# Patient Record
Sex: FEMALE | Race: WHITE | Employment: UNEMPLOYED | URBAN - METROPOLITAN AREA
[De-identification: names, ages, dates, MRNs, and addresses within clinical notes are randomized per-mention and may not be internally consistent; named-entity substitution may affect disease eponyms.]

---

## 2017-02-02 ENCOUNTER — TELEPHONE (OUTPATIENT)
Dept: FAMILY MEDICINE CLINIC | Age: 27
End: 2017-02-02

## 2017-02-03 ENCOUNTER — OFFICE VISIT (OUTPATIENT)
Dept: FAMILY MEDICINE CLINIC | Age: 27
End: 2017-02-03

## 2017-02-03 VITALS
WEIGHT: 188 LBS | TEMPERATURE: 97.8 F | BODY MASS INDEX: 33.31 KG/M2 | HEIGHT: 63 IN | OXYGEN SATURATION: 98 % | HEART RATE: 87 BPM | DIASTOLIC BLOOD PRESSURE: 64 MMHG | SYSTOLIC BLOOD PRESSURE: 122 MMHG | RESPIRATION RATE: 12 BRPM

## 2017-02-03 DIAGNOSIS — E78.2 MIXED HYPERLIPIDEMIA: Primary | ICD-10-CM

## 2017-02-03 DIAGNOSIS — Z3A.01 LESS THAN 8 WEEKS GESTATION OF PREGNANCY: ICD-10-CM

## 2017-02-03 PROCEDURE — 99213 OFFICE O/P EST LOW 20 MIN: CPT | Performed by: FAMILY MEDICINE

## 2017-02-03 RX ORDER — CITALOPRAM 10 MG/1
10 TABLET ORAL DAILY
COMMUNITY
Start: 2017-01-19 | End: 2017-02-03

## 2017-02-03 RX ORDER — AMOXICILLIN 500 MG
CAPSULE ORAL
COMMUNITY
End: 2017-11-21 | Stop reason: ALTCHOICE

## 2017-02-03 RX ORDER — VITAMIN A, ASCORBIC ACID, CHOLECALCIFEROL, .ALPHA.-TOCOPHEROL ACETATE, DL-, THIAMINE MONONITRATE, RIBOFLAVIN, NIACINAMIDE, PYRIDOXINE HYDROCHLORIDE, FOLIC ACID, CYANOCOBALAMIN, CALCIUM CARBONATE, IRON, ZINC OXIDE, AND CUPRIC OXIDE 4000; 120; 400; 22; 1.84; 3; 20; 10; 1; 12; 200; 29; 25; 2 [IU]/1; MG/1; [IU]/1; [IU]/1; MG/1; MG/1; MG/1; MG/1; MG/1; UG/1; MG/1; MG/1; MG/1; MG/1
1 TABLET ORAL DAILY
Qty: 90 TABLET | Refills: 3 | Status: SHIPPED | OUTPATIENT
Start: 2017-02-03 | End: 2017-11-21 | Stop reason: ALTCHOICE

## 2017-02-03 ASSESSMENT — ENCOUNTER SYMPTOMS
ALLERGIC/IMMUNOLOGIC NEGATIVE: 1
EYES NEGATIVE: 1
GASTROINTESTINAL NEGATIVE: 1
ABDOMINAL PAIN: 0
RESPIRATORY NEGATIVE: 1

## 2017-05-10 ENCOUNTER — OFFICE VISIT (OUTPATIENT)
Dept: FAMILY MEDICINE CLINIC | Age: 27
End: 2017-05-10

## 2017-05-10 ENCOUNTER — TELEPHONE (OUTPATIENT)
Dept: FAMILY MEDICINE CLINIC | Age: 27
End: 2017-05-10

## 2017-05-10 VITALS
RESPIRATION RATE: 18 BRPM | DIASTOLIC BLOOD PRESSURE: 68 MMHG | HEIGHT: 64 IN | BODY MASS INDEX: 33.29 KG/M2 | HEART RATE: 74 BPM | TEMPERATURE: 97.5 F | OXYGEN SATURATION: 97 % | WEIGHT: 195 LBS | SYSTOLIC BLOOD PRESSURE: 124 MMHG

## 2017-05-10 DIAGNOSIS — J01.10 ACUTE NON-RECURRENT FRONTAL SINUSITIS: Primary | ICD-10-CM

## 2017-05-10 PROCEDURE — 99213 OFFICE O/P EST LOW 20 MIN: CPT | Performed by: FAMILY MEDICINE

## 2017-05-10 RX ORDER — AMOXICILLIN 875 MG/1
875 TABLET, COATED ORAL 2 TIMES DAILY
Qty: 20 TABLET | Refills: 0 | Status: SHIPPED | OUTPATIENT
Start: 2017-05-10 | End: 2017-05-20

## 2017-05-10 ASSESSMENT — ENCOUNTER SYMPTOMS
SINUS PRESSURE: 1
RESPIRATORY NEGATIVE: 1
COUGH: 0
CHEST TIGHTNESS: 0
RHINORRHEA: 1
EYES NEGATIVE: 1
GASTROINTESTINAL NEGATIVE: 1

## 2017-05-12 ENCOUNTER — TELEPHONE (OUTPATIENT)
Dept: FAMILY MEDICINE CLINIC | Age: 27
End: 2017-05-12

## 2017-05-12 DIAGNOSIS — H05.119: Primary | ICD-10-CM

## 2017-09-21 ENCOUNTER — OFFICE VISIT (OUTPATIENT)
Dept: FAMILY MEDICINE CLINIC | Age: 27
End: 2017-09-21

## 2017-09-21 VITALS
HEART RATE: 89 BPM | TEMPERATURE: 98.1 F | BODY MASS INDEX: 3.76 KG/M2 | RESPIRATION RATE: 14 BRPM | WEIGHT: 22 LBS | SYSTOLIC BLOOD PRESSURE: 120 MMHG | HEIGHT: 64 IN | OXYGEN SATURATION: 98 % | DIASTOLIC BLOOD PRESSURE: 70 MMHG

## 2017-09-21 DIAGNOSIS — S00.421A: ICD-10-CM

## 2017-09-21 DIAGNOSIS — H66.002 ACUTE SUPPURATIVE OTITIS MEDIA OF LEFT EAR WITHOUT SPONTANEOUS RUPTURE OF TYMPANIC MEMBRANE, RECURRENCE NOT SPECIFIED: Primary | ICD-10-CM

## 2017-09-21 PROCEDURE — 99213 OFFICE O/P EST LOW 20 MIN: CPT | Performed by: NURSE PRACTITIONER

## 2017-09-21 RX ORDER — CIPROFLOXACIN AND DEXAMETHASONE 3; 1 MG/ML; MG/ML
4 SUSPENSION/ DROPS AURICULAR (OTIC) 2 TIMES DAILY
Qty: 1 BOTTLE | Refills: 0 | Status: SHIPPED | OUTPATIENT
Start: 2017-09-21 | End: 2017-09-28

## 2017-09-21 RX ORDER — AMOXICILLIN 875 MG/1
TABLET, COATED ORAL
Refills: 0 | COMMUNITY
Start: 2017-09-13 | End: 2017-09-23

## 2017-09-21 RX ORDER — AMOXICILLIN AND CLAVULANATE POTASSIUM 200; 28.5 MG/1; MG/1
1 TABLET, CHEWABLE ORAL 2 TIMES DAILY
Qty: 20 TABLET | Refills: 0 | Status: SHIPPED | OUTPATIENT
Start: 2017-09-21 | End: 2017-10-01

## 2017-09-21 ASSESSMENT — PATIENT HEALTH QUESTIONNAIRE - PHQ9
SUM OF ALL RESPONSES TO PHQ QUESTIONS 1-9: 0
SUM OF ALL RESPONSES TO PHQ9 QUESTIONS 1 & 2: 0
1. LITTLE INTEREST OR PLEASURE IN DOING THINGS: 0
2. FEELING DOWN, DEPRESSED OR HOPELESS: 0

## 2017-09-23 ASSESSMENT — ENCOUNTER SYMPTOMS
DIARRHEA: 0
ABDOMINAL PAIN: 0
VOMITING: 0
RHINORRHEA: 0
COUGH: 0
SORE THROAT: 0

## 2017-11-18 DIAGNOSIS — E78.2 MIXED HYPERLIPIDEMIA: ICD-10-CM

## 2017-11-18 DIAGNOSIS — Z11.4 SCREENING FOR HIV WITHOUT PRESENCE OF RISK FACTORS: ICD-10-CM

## 2017-11-18 LAB
ALBUMIN SERPL-MCNC: 4 G/DL (ref 3.9–4.9)
ALP BLD-CCNC: 96 U/L (ref 40–130)
ALT SERPL-CCNC: 32 U/L (ref 0–33)
ANION GAP SERPL CALCULATED.3IONS-SCNC: 16 MEQ/L (ref 7–13)
AST SERPL-CCNC: 20 U/L (ref 0–35)
BILIRUB SERPL-MCNC: 0.4 MG/DL (ref 0–1.2)
BUN BLDV-MCNC: 17 MG/DL (ref 6–20)
CALCIUM SERPL-MCNC: 9.1 MG/DL (ref 8.6–10.2)
CHLORIDE BLD-SCNC: 102 MEQ/L (ref 98–107)
CHOLESTEROL, TOTAL: 277 MG/DL (ref 0–199)
CO2: 24 MEQ/L (ref 22–29)
CREAT SERPL-MCNC: 0.64 MG/DL (ref 0.5–0.9)
GFR AFRICAN AMERICAN: >60
GFR NON-AFRICAN AMERICAN: >60
GLOBULIN: 2.8 G/DL (ref 2.3–3.5)
GLUCOSE BLD-MCNC: 78 MG/DL (ref 74–109)
HDLC SERPL-MCNC: 43 MG/DL (ref 40–59)
LDL CHOLESTEROL CALCULATED: 184 MG/DL (ref 0–129)
POTASSIUM SERPL-SCNC: 4.8 MEQ/L (ref 3.5–5.1)
SODIUM BLD-SCNC: 142 MEQ/L (ref 132–144)
TOTAL PROTEIN: 6.8 G/DL (ref 6.4–8.1)
TRIGL SERPL-MCNC: 250 MG/DL (ref 0–200)

## 2017-11-21 ENCOUNTER — OFFICE VISIT (OUTPATIENT)
Dept: FAMILY MEDICINE CLINIC | Age: 27
End: 2017-11-21

## 2017-11-21 VITALS
OXYGEN SATURATION: 98 % | HEART RATE: 71 BPM | TEMPERATURE: 98.2 F | RESPIRATION RATE: 16 BRPM | SYSTOLIC BLOOD PRESSURE: 120 MMHG | DIASTOLIC BLOOD PRESSURE: 82 MMHG | WEIGHT: 198 LBS | HEIGHT: 64 IN | BODY MASS INDEX: 33.8 KG/M2

## 2017-11-21 DIAGNOSIS — G93.2 PSEUDOTUMOR CEREBRI: ICD-10-CM

## 2017-11-21 DIAGNOSIS — G44.219 EPISODIC TENSION-TYPE HEADACHE, NOT INTRACTABLE: ICD-10-CM

## 2017-11-21 DIAGNOSIS — B36.0 TINEA VERSICOLOR: ICD-10-CM

## 2017-11-21 DIAGNOSIS — E78.2 MIXED HYPERLIPIDEMIA: Primary | ICD-10-CM

## 2017-11-21 LAB — HIV-1 AND HIV-2 ANTIBODIES: NEGATIVE

## 2017-11-21 PROCEDURE — 99214 OFFICE O/P EST MOD 30 MIN: CPT | Performed by: FAMILY MEDICINE

## 2017-11-21 RX ORDER — PRAVASTATIN SODIUM 40 MG
40 TABLET ORAL EVERY EVENING
Qty: 30 TABLET | Refills: 11 | Status: SHIPPED | OUTPATIENT
Start: 2017-11-21 | End: 2018-05-29 | Stop reason: SDUPTHER

## 2017-11-21 RX ORDER — HYDROCHLOROTHIAZIDE 12.5 MG/1
12.5 TABLET ORAL DAILY
Qty: 30 TABLET | Refills: 11 | Status: SHIPPED | OUTPATIENT
Start: 2017-11-21

## 2017-11-21 RX ORDER — NORETHINDRONE ACETATE AND ETHINYL ESTRADIOL 1MG-20(24)
KIT ORAL
Refills: 3 | COMMUNITY
Start: 2017-11-15

## 2017-11-21 ASSESSMENT — ENCOUNTER SYMPTOMS
ABDOMINAL PAIN: 0
GASTROINTESTINAL NEGATIVE: 1
ALLERGIC/IMMUNOLOGIC NEGATIVE: 1
RESPIRATORY NEGATIVE: 1
SHORTNESS OF BREATH: 0
EYES NEGATIVE: 1

## 2017-11-21 NOTE — PROGRESS NOTES
Social History Main Topics    Smoking status: Never Smoker    Smokeless tobacco: Never Used    Alcohol use No    Drug use: No    Sexual activity: Not on file     Other Topics Concern    Not on file     Social History Narrative    No narrative on file     Family History   Problem Relation Age of Onset    High Blood Pressure Mother     Depression Mother     Anxiety Disorder Mother     High Blood Pressure Father     High Cholesterol Father     Anxiety Disorder Brother      ABM     Allergies   Allergen Reactions    Zoloft [Sertraline Hcl] Anxiety     Thought of harming self    Buspirone Anxiety    Paxil [Paroxetine Hcl] Other (See Comments)     flushing    Wellbutrin [Bupropion] Anxiety     No current outpatient prescriptions on file prior to visit. No current facility-administered medications on file prior to visit. Objective    Vitals:    11/21/17 1645   BP: 120/82   Pulse: 71   Resp: 16   Temp: 98.2 °F (36.8 °C)   TempSrc: Tympanic   SpO2: 98%   Weight: 198 lb (89.8 kg)   Height: 5' 4\" (1.626 m)     Physical Exam   Constitutional: Vital signs are normal. She appears well-developed and well-nourished. No distress. HENT:   Head: Normocephalic and atraumatic. Right Ear: Tympanic membrane, external ear and ear canal normal. Tympanic membrane is not erythematous and not retracted. No middle ear effusion. Left Ear: Tympanic membrane, external ear and ear canal normal. Tympanic membrane is not erythematous and not retracted. No middle ear effusion. Nose: Nose normal. No mucosal edema, rhinorrhea or septal deviation. Right sinus exhibits no maxillary sinus tenderness and no frontal sinus tenderness. Left sinus exhibits no maxillary sinus tenderness and no frontal sinus tenderness. Mouth/Throat: Uvula is midline, oropharynx is clear and moist and mucous membranes are normal.   Eyes: Conjunctivae, EOM and lids are normal. Pupils are equal, round, and reactive to light.    Neck: Trachea Panel     Standing Status:   Future     Standing Expiration Date:   11/21/2018     Order Specific Question:   Is Patient Fasting?/# of Hours     Answer:   8    TSH ULTRASENSITIVE, DIRECTED     Standing Status:   Future     Standing Expiration Date:   11/21/2018    OMT 1-2 BODY REGIONS     Orders Placed This Encounter   Medications    pravastatin (PRAVACHOL) 40 MG tablet     Sig: Take 1 tablet by mouth every evening     Dispense:  30 tablet     Refill:  11    hydrochlorothiazide (HYDRODIURIL) 12.5 MG tablet     Sig: Take 1 tablet by mouth daily     Dispense:  30 tablet     Refill:  11     Medications Discontinued During This Encounter   Medication Reason    Prenatal Vit-Iron Carbonyl-FA (PRENATAL PLUS IRON) 29-1 MG TABS Therapy completed    Omega-3 Fatty Acids (FISH OIL) 1200 MG CAPS Therapy completed   OMT Soft Tissue/suboccipital release done in cervical area with 50% reported resolution of symptomology. Patient tolerated well. Home exercises were demonstrated. Maintain selenium shampoo at least weekly  Counseling given: Yes      Return in about 6 months (around 5/21/2018).     Blair Busby, DO

## 2018-05-21 DIAGNOSIS — E78.2 MIXED HYPERLIPIDEMIA: ICD-10-CM

## 2018-05-21 DIAGNOSIS — G44.219 EPISODIC TENSION-TYPE HEADACHE, NOT INTRACTABLE: ICD-10-CM

## 2018-05-21 LAB
ALBUMIN SERPL-MCNC: 4.5 G/DL (ref 3.9–4.9)
ALP BLD-CCNC: 81 U/L (ref 40–130)
ALT SERPL-CCNC: 17 U/L (ref 0–33)
ANION GAP SERPL CALCULATED.3IONS-SCNC: 16 MEQ/L (ref 7–13)
AST SERPL-CCNC: 16 U/L (ref 0–35)
BASOPHILS ABSOLUTE: 0 K/UL (ref 0–0.2)
BASOPHILS RELATIVE PERCENT: 0.3 %
BILIRUB SERPL-MCNC: 0.5 MG/DL (ref 0–1.2)
BUN BLDV-MCNC: 15 MG/DL (ref 6–20)
CALCIUM SERPL-MCNC: 9.5 MG/DL (ref 8.6–10.2)
CHLORIDE BLD-SCNC: 101 MEQ/L (ref 98–107)
CHOLESTEROL, TOTAL: 237 MG/DL (ref 0–199)
CO2: 24 MEQ/L (ref 22–29)
CREAT SERPL-MCNC: 0.66 MG/DL (ref 0.5–0.9)
EOSINOPHILS ABSOLUTE: 0.1 K/UL (ref 0–0.7)
EOSINOPHILS RELATIVE PERCENT: 1 %
GFR AFRICAN AMERICAN: >60
GFR NON-AFRICAN AMERICAN: >60
GLOBULIN: 2.6 G/DL (ref 2.3–3.5)
GLUCOSE BLD-MCNC: 83 MG/DL (ref 74–109)
HCT VFR BLD CALC: 42.4 % (ref 37–47)
HDLC SERPL-MCNC: 47 MG/DL (ref 40–59)
HEMOGLOBIN: 14.7 G/DL (ref 12–16)
LDL CHOLESTEROL CALCULATED: 148 MG/DL (ref 0–129)
LYMPHOCYTES ABSOLUTE: 2.5 K/UL (ref 1–4.8)
LYMPHOCYTES RELATIVE PERCENT: 28.6 %
MCH RBC QN AUTO: 30.1 PG (ref 27–31.3)
MCHC RBC AUTO-ENTMCNC: 34.6 % (ref 33–37)
MCV RBC AUTO: 87 FL (ref 82–100)
MONOCYTES ABSOLUTE: 0.5 K/UL (ref 0.2–0.8)
MONOCYTES RELATIVE PERCENT: 5.4 %
NEUTROPHILS ABSOLUTE: 5.6 K/UL (ref 1.4–6.5)
NEUTROPHILS RELATIVE PERCENT: 64.7 %
PDW BLD-RTO: 13.9 % (ref 11.5–14.5)
PLATELET # BLD: 270 K/UL (ref 130–400)
POTASSIUM SERPL-SCNC: 4.4 MEQ/L (ref 3.5–5.1)
RBC # BLD: 4.87 M/UL (ref 4.2–5.4)
SODIUM BLD-SCNC: 141 MEQ/L (ref 132–144)
TOTAL PROTEIN: 7.1 G/DL (ref 6.4–8.1)
TRIGL SERPL-MCNC: 208 MG/DL (ref 0–200)
WBC # BLD: 8.7 K/UL (ref 4.8–10.8)

## 2018-05-22 ENCOUNTER — TELEPHONE (OUTPATIENT)
Dept: FAMILY MEDICINE CLINIC | Age: 28
End: 2018-05-22

## 2018-05-29 ENCOUNTER — OFFICE VISIT (OUTPATIENT)
Dept: FAMILY MEDICINE CLINIC | Age: 28
End: 2018-05-29
Payer: COMMERCIAL

## 2018-05-29 VITALS
DIASTOLIC BLOOD PRESSURE: 80 MMHG | HEIGHT: 63 IN | OXYGEN SATURATION: 99 % | HEART RATE: 70 BPM | SYSTOLIC BLOOD PRESSURE: 100 MMHG | BODY MASS INDEX: 35.44 KG/M2 | RESPIRATION RATE: 16 BRPM | WEIGHT: 200 LBS | TEMPERATURE: 98.6 F

## 2018-05-29 DIAGNOSIS — G93.2 PSEUDOTUMOR CEREBRI: ICD-10-CM

## 2018-05-29 DIAGNOSIS — E78.2 MIXED HYPERLIPIDEMIA: Primary | ICD-10-CM

## 2018-05-29 PROCEDURE — 99214 OFFICE O/P EST MOD 30 MIN: CPT | Performed by: FAMILY MEDICINE

## 2018-05-29 RX ORDER — PRAVASTATIN SODIUM 80 MG/1
80 TABLET ORAL EVERY EVENING
Qty: 30 TABLET | Refills: 5 | Status: SHIPPED | OUTPATIENT
Start: 2018-05-29

## 2018-05-29 ASSESSMENT — ENCOUNTER SYMPTOMS
RESPIRATORY NEGATIVE: 1
GASTROINTESTINAL NEGATIVE: 1
EYES NEGATIVE: 1
ALLERGIC/IMMUNOLOGIC NEGATIVE: 1

## 2018-10-24 ENCOUNTER — OFFICE VISIT (OUTPATIENT)
Dept: FAMILY MEDICINE CLINIC | Age: 28
End: 2018-10-24
Payer: COMMERCIAL

## 2018-10-24 VITALS
HEART RATE: 85 BPM | SYSTOLIC BLOOD PRESSURE: 118 MMHG | BODY MASS INDEX: 35.51 KG/M2 | WEIGHT: 208 LBS | DIASTOLIC BLOOD PRESSURE: 82 MMHG | OXYGEN SATURATION: 98 % | TEMPERATURE: 97.8 F | RESPIRATION RATE: 15 BRPM | HEIGHT: 64 IN

## 2018-10-24 DIAGNOSIS — H10.021 PINK EYE DISEASE OF RIGHT EYE: Primary | ICD-10-CM

## 2018-10-24 PROCEDURE — 99173 VISUAL ACUITY SCREEN: CPT | Performed by: INTERNAL MEDICINE

## 2018-10-24 PROCEDURE — 99213 OFFICE O/P EST LOW 20 MIN: CPT | Performed by: INTERNAL MEDICINE

## 2018-10-24 RX ORDER — ERYTHROMYCIN 5 MG/G
OINTMENT OPHTHALMIC 3 TIMES DAILY
Qty: 1 TUBE | Refills: 0 | Status: SHIPPED | OUTPATIENT
Start: 2018-10-24 | End: 2018-11-03

## 2018-10-24 ASSESSMENT — PATIENT HEALTH QUESTIONNAIRE - PHQ9
1. LITTLE INTEREST OR PLEASURE IN DOING THINGS: 0
SUM OF ALL RESPONSES TO PHQ QUESTIONS 1-9: 0
SUM OF ALL RESPONSES TO PHQ9 QUESTIONS 1 & 2: 0
2. FEELING DOWN, DEPRESSED OR HOPELESS: 0
SUM OF ALL RESPONSES TO PHQ QUESTIONS 1-9: 0

## 2018-10-24 ASSESSMENT — ENCOUNTER SYMPTOMS
EYE PAIN: 0
BACK PAIN: 0
ABDOMINAL PAIN: 0
SHORTNESS OF BREATH: 0

## 2019-03-27 ENCOUNTER — TELEPHONE (OUTPATIENT)
Dept: FAMILY MEDICINE CLINIC | Age: 29
End: 2019-03-27

## 2024-02-26 PROBLEM — J30.1 ALLERGIC RHINITIS DUE TO POLLEN: Status: ACTIVE | Noted: 2024-02-26

## 2024-02-26 PROBLEM — G93.2 PSEUDOTUMOR CEREBRI: Status: ACTIVE | Noted: 2017-11-21

## 2024-02-26 PROBLEM — D61.9: Status: ACTIVE | Noted: 2024-02-26

## 2024-02-26 PROBLEM — Q25.79: Status: ACTIVE | Noted: 2024-02-26

## 2024-02-26 PROBLEM — F17.200 TOBACCO USE DISORDER: Status: ACTIVE | Noted: 2024-02-26

## 2024-02-26 PROBLEM — F41.9 ANXIETY DISORDER: Status: ACTIVE | Noted: 2024-02-26

## 2024-02-26 PROBLEM — R91.8 LUNG NODULES: Status: ACTIVE | Noted: 2024-02-26

## 2024-03-01 ENCOUNTER — LAB (OUTPATIENT)
Dept: LAB | Facility: LAB | Age: 34
End: 2024-03-01
Payer: COMMERCIAL

## 2024-03-01 ENCOUNTER — OFFICE VISIT (OUTPATIENT)
Dept: PRIMARY CARE | Facility: CLINIC | Age: 34
End: 2024-03-01
Payer: COMMERCIAL

## 2024-03-01 VITALS
HEIGHT: 62 IN | SYSTOLIC BLOOD PRESSURE: 108 MMHG | RESPIRATION RATE: 20 BRPM | HEART RATE: 76 BPM | TEMPERATURE: 97.9 F | BODY MASS INDEX: 39.75 KG/M2 | DIASTOLIC BLOOD PRESSURE: 80 MMHG | WEIGHT: 216 LBS

## 2024-03-01 DIAGNOSIS — Z00.00 HEALTH CARE MAINTENANCE: ICD-10-CM

## 2024-03-01 DIAGNOSIS — Z00.00 HEALTH CARE MAINTENANCE: Primary | ICD-10-CM

## 2024-03-01 DIAGNOSIS — Z13.31 SCREENING FOR DEPRESSION: ICD-10-CM

## 2024-03-01 DIAGNOSIS — E66.9 OBESITY (BMI 35.0-39.9 WITHOUT COMORBIDITY): ICD-10-CM

## 2024-03-01 PROBLEM — J01.40 ACUTE NON-RECURRENT PANSINUSITIS: Status: RESOLVED | Noted: 2024-03-01 | Resolved: 2024-03-01

## 2024-03-01 PROBLEM — R00.0 TACHYCARDIA: Status: RESOLVED | Noted: 2024-03-01 | Resolved: 2024-03-01

## 2024-03-01 PROBLEM — Z20.822 SUSPECTED COVID-19 VIRUS INFECTION: Status: RESOLVED | Noted: 2024-03-01 | Resolved: 2024-03-01

## 2024-03-01 PROBLEM — H47.10 OPTIC DISC EDEMA: Status: RESOLVED | Noted: 2024-03-01 | Resolved: 2024-03-01

## 2024-03-01 PROBLEM — J34.89 NASAL CONGESTION WITH RHINORRHEA: Status: RESOLVED | Noted: 2024-03-01 | Resolved: 2024-03-01

## 2024-03-01 PROBLEM — J00 NASOPHARYNGITIS: Status: RESOLVED | Noted: 2024-03-01 | Resolved: 2024-03-01

## 2024-03-01 PROBLEM — H66.90 ACUTE OTITIS MEDIA: Status: RESOLVED | Noted: 2024-03-01 | Resolved: 2024-03-01

## 2024-03-01 PROBLEM — R09.81 NASAL CONGESTION WITH RHINORRHEA: Status: RESOLVED | Noted: 2024-03-01 | Resolved: 2024-03-01

## 2024-03-01 PROBLEM — D61.9: Status: RESOLVED | Noted: 2024-02-26 | Resolved: 2024-03-01

## 2024-03-01 PROBLEM — H53.47: Status: ACTIVE | Noted: 2022-11-17

## 2024-03-01 PROBLEM — G93.2 PSEUDOTUMOR: Status: RESOLVED | Noted: 2024-03-01 | Resolved: 2024-03-01

## 2024-03-01 PROBLEM — D18.03 HEPATIC HEMANGIOMA: Status: ACTIVE | Noted: 2024-03-01

## 2024-03-01 PROBLEM — B00.1 RECURRENT COLD SORES: Status: ACTIVE | Noted: 2024-03-01

## 2024-03-01 PROBLEM — R22.42: Status: RESOLVED | Noted: 2024-03-01 | Resolved: 2024-03-01

## 2024-03-01 PROBLEM — H53.8 BLURRY VISION, BILATERAL: Status: RESOLVED | Noted: 2022-11-17 | Resolved: 2024-03-01

## 2024-03-01 PROBLEM — M79.671 RIGHT FOOT PAIN: Status: RESOLVED | Noted: 2024-03-01 | Resolved: 2024-03-01

## 2024-03-01 LAB
25(OH)D3 SERPL-MCNC: 21 NG/ML (ref 30–100)
ALBUMIN SERPL BCP-MCNC: 4.3 G/DL (ref 3.4–5)
ALP SERPL-CCNC: 63 U/L (ref 33–110)
ALT SERPL W P-5'-P-CCNC: 23 U/L (ref 7–45)
ANION GAP SERPL CALC-SCNC: 13 MMOL/L (ref 10–20)
AST SERPL W P-5'-P-CCNC: 20 U/L (ref 9–39)
BILIRUB SERPL-MCNC: 0.6 MG/DL (ref 0–1.2)
BUN SERPL-MCNC: 11 MG/DL (ref 6–23)
CALCIUM SERPL-MCNC: 9.5 MG/DL (ref 8.6–10.3)
CHLORIDE SERPL-SCNC: 102 MMOL/L (ref 98–107)
CHOLEST SERPL-MCNC: 258 MG/DL (ref 0–199)
CHOLESTEROL/HDL RATIO: 5.8
CO2 SERPL-SCNC: 29 MMOL/L (ref 21–32)
CREAT SERPL-MCNC: 0.69 MG/DL (ref 0.5–1.05)
EGFRCR SERPLBLD CKD-EPI 2021: >90 ML/MIN/1.73M*2
ERYTHROCYTE [DISTWIDTH] IN BLOOD BY AUTOMATED COUNT: 13.2 % (ref 11.5–14.5)
GLUCOSE SERPL-MCNC: 90 MG/DL (ref 74–99)
HCT VFR BLD AUTO: 45.1 % (ref 36–46)
HDLC SERPL-MCNC: 44.2 MG/DL
HGB BLD-MCNC: 14.9 G/DL (ref 12–16)
LDLC SERPL CALC-MCNC: 189 MG/DL
MCH RBC QN AUTO: 29.1 PG (ref 26–34)
MCHC RBC AUTO-ENTMCNC: 33 G/DL (ref 32–36)
MCV RBC AUTO: 88 FL (ref 80–100)
NON HDL CHOLESTEROL: 214 MG/DL (ref 0–149)
NRBC BLD-RTO: 0 /100 WBCS (ref 0–0)
PLATELET # BLD AUTO: 350 X10*3/UL (ref 150–450)
POTASSIUM SERPL-SCNC: 4.7 MMOL/L (ref 3.5–5.3)
PROT SERPL-MCNC: 7.3 G/DL (ref 6.4–8.2)
RBC # BLD AUTO: 5.12 X10*6/UL (ref 4–5.2)
SODIUM SERPL-SCNC: 139 MMOL/L (ref 136–145)
TRIGL SERPL-MCNC: 124 MG/DL (ref 0–149)
TSH SERPL-ACNC: 1.75 MIU/L (ref 0.44–3.98)
VLDL: 25 MG/DL (ref 0–40)
WBC # BLD AUTO: 7.4 X10*3/UL (ref 4.4–11.3)

## 2024-03-01 PROCEDURE — 99204 OFFICE O/P NEW MOD 45 MIN: CPT | Performed by: FAMILY MEDICINE

## 2024-03-01 PROCEDURE — 84443 ASSAY THYROID STIM HORMONE: CPT

## 2024-03-01 PROCEDURE — 82306 VITAMIN D 25 HYDROXY: CPT

## 2024-03-01 PROCEDURE — 36415 COLL VENOUS BLD VENIPUNCTURE: CPT

## 2024-03-01 PROCEDURE — 85027 COMPLETE CBC AUTOMATED: CPT

## 2024-03-01 PROCEDURE — 96127 BRIEF EMOTIONAL/BEHAV ASSMT: CPT | Performed by: FAMILY MEDICINE

## 2024-03-01 PROCEDURE — 80053 COMPREHEN METABOLIC PANEL: CPT

## 2024-03-01 PROCEDURE — 80061 LIPID PANEL: CPT

## 2024-03-01 RX ORDER — VALACYCLOVIR HYDROCHLORIDE 1 G/1
TABLET, FILM COATED ORAL
COMMUNITY
Start: 2023-11-03

## 2024-03-01 ASSESSMENT — ENCOUNTER SYMPTOMS
ARTHRALGIAS: 0
DIFFICULTY URINATING: 0
NERVOUS/ANXIOUS: 1
SORE THROAT: 0
WHEEZING: 0
SEIZURES: 0
RHINORRHEA: 0
BRUISES/BLEEDS EASILY: 0
FEVER: 0
DYSPHORIC MOOD: 1
VOMITING: 0
FATIGUE: 0
SHORTNESS OF BREATH: 0
DIARRHEA: 0
HEMATURIA: 0
COUGH: 0

## 2024-03-01 ASSESSMENT — PATIENT HEALTH QUESTIONNAIRE - PHQ9
SUM OF ALL RESPONSES TO PHQ9 QUESTIONS 1 AND 2: 0
2. FEELING DOWN, DEPRESSED OR HOPELESS: NOT AT ALL
1. LITTLE INTEREST OR PLEASURE IN DOING THINGS: NOT AT ALL

## 2024-03-01 ASSESSMENT — LIFESTYLE VARIABLES: HOW OFTEN DO YOU HAVE A DRINK CONTAINING ALCOHOL: MONTHLY OR LESS

## 2024-03-01 NOTE — ASSESSMENT & PLAN NOTE
Advise healthy diet and exercise  Offered nutritional counseling, declined by pt  Has joined [employer]  Marlon printed

## 2024-03-01 NOTE — ASSESSMENT & PLAN NOTE
Pt with pp depression , was on zoloft and others, all with side effects  Has been stable off me3ds.

## 2024-03-01 NOTE — PROGRESS NOTES
"Subjective   Patient ID: Christianne Jurado is a 33 y.o. female who presents for Menorrhagia (Pt has been getting clots during menses. Some periods will be normal but sometimes they are heavy. She gets dizzy the week before or around her period. She has had abnormal paps in the past and goes yearly. Her GYN retired and she needs a referral. ).    HPI     Review of Systems   Constitutional:  Negative for fatigue and fever.   HENT:  Negative for congestion, ear pain, hearing loss, rhinorrhea and sore throat.    Eyes:  Negative for visual disturbance.   Respiratory:  Negative for cough, shortness of breath and wheezing.    Gastrointestinal:  Negative for diarrhea and vomiting.   Genitourinary:  Negative for difficulty urinating, hematuria, vaginal bleeding and vaginal discharge.        Pt with abn pap tests in the past, needs new gyn.  Pt with hx of xs menstral bleeding on and off  LMP= 2/22/24   Musculoskeletal:  Negative for arthralgias.   Neurological:  Negative for seizures and syncope.        Sees neuro for pseudotumor cerebrii   Hematological:  Does not bruise/bleed easily.   Psychiatric/Behavioral:  Positive for dysphoric mood. Negative for suicidal ideas. The patient is nervous/anxious.         No meds has been stable       Objective   /80   Pulse 76   Temp 36.6 °C (97.9 °F)   Resp 20   Ht 1.575 m (5' 2\")   Wt 98 kg (216 lb)   BMI 39.51 kg/m²     Physical Exam  Vitals (+3.) and nursing note reviewed.   HENT:      Head: Normocephalic and atraumatic.      Right Ear: Tympanic membrane, ear canal and external ear normal.      Left Ear: Tympanic membrane, ear canal and external ear normal.      Nose: Nose normal.      Mouth/Throat:      Mouth: Mucous membranes are moist.      Pharynx: Oropharynx is clear.   Eyes:      Extraocular Movements: Extraocular movements intact.      Conjunctiva/sclera: Conjunctivae normal.      Pupils: Pupils are equal, round, and reactive to light.   Cardiovascular:      Rate and " Rhythm: Normal rate and regular rhythm.      Heart sounds: Normal heart sounds.   Pulmonary:      Effort: Pulmonary effort is normal.      Breath sounds: Normal breath sounds.   Musculoskeletal:      Cervical back: Neck supple.   Lymphadenopathy:      Cervical: No cervical adenopathy.   Skin:     General: Skin is warm and dry.   Neurological:      General: No focal deficit present.      Mental Status: She is alert.   Psychiatric:         Mood and Affect: Mood normal.         Behavior: Behavior normal.       Assessment/Plan   Problem List Items Addressed This Visit             ICD-10-CM    Screening for depression Z13.31    Obesity (BMI 35.0-39.9 without comorbidity) E66.9     Advise healthy diet and exercise  Offered nutritional counseling, declined by pt  Has joined [employer]  [facility] care maintenance - Primary Z00.00    Relevant Orders    Referral to Gynecology    CBC    Comprehensive Metabolic Panel    Lipid Panel    TSH with reflex to Free T4 if abnormal    Vitamin D 25-Hydroxy,Total    Follow Up In [facility] - PCP - Health Maintenance

## 2024-03-04 DIAGNOSIS — E55.9 VITAMIN D DEFICIENCY: ICD-10-CM

## 2024-03-04 DIAGNOSIS — E78.5 HYPERLIPIDEMIA, UNSPECIFIED HYPERLIPIDEMIA TYPE: ICD-10-CM

## 2024-03-04 RX ORDER — ATORVASTATIN CALCIUM 10 MG/1
10 TABLET, FILM COATED ORAL DAILY
Qty: 30 TABLET | Refills: 5 | Status: SHIPPED | OUTPATIENT
Start: 2024-03-04 | End: 2024-08-31

## 2024-03-04 RX ORDER — ERGOCALCIFEROL 1.25 MG/1
50000 CAPSULE ORAL
Qty: 12 CAPSULE | Refills: 0 | Status: SHIPPED | OUTPATIENT
Start: 2024-03-04 | End: 2024-05-27

## 2024-03-04 NOTE — TELEPHONE ENCOUNTER
----- Message from Danay Zavala MD sent at 3/4/2024  8:23 AM EST -----  Call pt, chol and ldl are high, start atorvastatin 10 mg daily and recheck lioids 2 mo  PT WITH LOW VIT D. START VIT D 50,000 international units  WEEKLY X 12 WK. THEN SWITCH TO OTC VIT D 400 international units  DAILY

## 2024-03-07 ENCOUNTER — HOSPITAL ENCOUNTER (OUTPATIENT)
Dept: RADIOLOGY | Facility: CLINIC | Age: 34
Discharge: HOME | End: 2024-03-07
Payer: COMMERCIAL

## 2024-03-07 DIAGNOSIS — H47.10 UNSPECIFIED PAPILLEDEMA: ICD-10-CM

## 2024-03-07 PROCEDURE — 70551 MRI BRAIN STEM W/O DYE: CPT

## 2024-03-11 ENCOUNTER — TELEPHONE (OUTPATIENT)
Dept: OPHTHALMOLOGY | Facility: CLINIC | Age: 34
End: 2024-03-11

## 2024-03-12 ENCOUNTER — TELEPHONE (OUTPATIENT)
Dept: OPHTHALMOLOGY | Facility: CLINIC | Age: 34
End: 2024-03-12

## 2024-03-21 ENCOUNTER — APPOINTMENT (OUTPATIENT)
Dept: OBSTETRICS AND GYNECOLOGY | Facility: CLINIC | Age: 34
End: 2024-03-21
Payer: COMMERCIAL

## 2024-04-03 NOTE — PROGRESS NOTES
"Assessment and Plan    6/16/2017 height 5`2\" & weight 197 pounds for BMI 36.    06/29/2017 MRI brain with contrast & MRV head, which I personally reviewed previously, shows distal transverse sinus narrowing. By report: “1. Unremarkable MRI of the brain. 2. There is dilatation of fluid in the bilateral optic sheaths, nonspecific finding, but can be seen with intracranial hypertension. There is questionable flattening of the bilateral optic discs. 3. There is narrowing of the bilateral transverse sinuses, nonspecific finding, but can be seen with intracranial hypertension. There is no dural venous sinus thrombosis.”    04/09/2021 HgB wnl   04/09/2021 Vitamin D 25-Hydroxy level 21 (L)  01/02/2020 TSH wnl     04/28/2023 OCT RNFL  & . (worse edema)  06/19/2017 OCT RNFL  & .    04/28/2023 HVF 24-2 OD fovea 36, wnl MD -0.55 & OS fovea 36, wnl MD -2.53.  06/19/2017 HVF 24-2 OD FP 22%, FN 4%, wnl MD +0.41 & OS nasal scatter MD -0.64.    This 33 year-old woman with a history of viral meningitis 7/2016 presents for evaluation of optic disc edema, last seen in 2017.    The findings are most suspicious for idiopathic intracranial hypertension. Alternatives include venous sinus thrombosis, intracranial mass and meningitic disease. MRI brain with contrast & MRV head will be ordered to adjudicate among the possibilities. If there is no evidence of intracranial mass or venous sinus thrombosis, lumbar puncture with opening pressure, protein, glucose & cell count will follow. If the opening pressure is above 20 cm water, the diagnosis will be secured, and the patient should begin treatment with acetazolamide if not otherwise contraindicated.    Plan    MRI brain with contrast & MRV head  If no imaging contraindication, lumbar puncture with opening pressure, protein, glucose & cell count  If opening pressure greater than 20 cm water, start acetazolamide 500 mg PO BID. We discussed expected side effects.  Weight " loss with Weight Management referral.    Follow up in 4-6 weeks with OCT & HVF or sooner if not improving. (dilated 04/28/2023)

## 2024-04-04 ENCOUNTER — OFFICE VISIT (OUTPATIENT)
Dept: OPHTHALMOLOGY | Facility: CLINIC | Age: 34
End: 2024-04-04
Payer: COMMERCIAL

## 2024-04-04 DIAGNOSIS — G93.2 IIH (IDIOPATHIC INTRACRANIAL HYPERTENSION): Primary | ICD-10-CM

## 2024-04-29 ENCOUNTER — OFFICE VISIT (OUTPATIENT)
Dept: OPHTHALMOLOGY | Facility: CLINIC | Age: 34
End: 2024-04-29
Payer: COMMERCIAL

## 2024-04-29 DIAGNOSIS — G93.2 IIH (IDIOPATHIC INTRACRANIAL HYPERTENSION): Primary | ICD-10-CM

## 2024-04-29 NOTE — PROGRESS NOTES
"Assessment and Plan    6/16/2017 height 5`2\" & weight 197 pounds for BMI 36.    06/29/2017 MRI brain with contrast & MRV head, which I personally reviewed previously, shows distal transverse sinus narrowing. By report: “1. Unremarkable MRI of the brain. 2. There is dilatation of fluid in the bilateral optic sheaths, nonspecific finding, but can be seen with intracranial hypertension. There is questionable flattening of the bilateral optic discs. 3. There is narrowing of the bilateral transverse sinuses, nonspecific finding, but can be seen with intracranial hypertension. There is no dural venous sinus thrombosis.”    04/09/2021 HgB wnl   04/09/2021 Vitamin D 25-Hydroxy level 21 (L)  01/02/2020 TSH wnl     04/28/2023 OCT RNFL  & . (worse edema)  06/19/2017 OCT RNFL  & .    04/28/2023 HVF 24-2 OD fovea 36, wnl MD -0.55 & OS fovea 36, wnl MD -2.53.  06/19/2017 HVF 24-2 OD FP 22%, FN 4%, wnl MD +0.41 & OS nasal scatter MD -0.64.    This 34 year-old woman with a history of viral meningitis 7/2016 presents for evaluation of optic disc edema, last seen in 2017.    The findings are most suspicious for idiopathic intracranial hypertension. Alternatives include venous sinus thrombosis, intracranial mass and meningitic disease. MRI brain with contrast & MRV head will be ordered to adjudicate among the possibilities. If there is no evidence of intracranial mass or venous sinus thrombosis, lumbar puncture with opening pressure, protein, glucose & cell count will follow. If the opening pressure is above 20 cm water, the diagnosis will be secured, and the patient should begin treatment with acetazolamide if not otherwise contraindicated.    Plan    MRI brain with contrast & MRV head  If no imaging contraindication, lumbar puncture with opening pressure, protein, glucose & cell count  If opening pressure greater than 20 cm water, start acetazolamide 500 mg PO BID. We discussed expected side effects.  Weight " loss with Weight Management referral.    Follow up in 4-6 weeks with OCT & HVF or sooner if not improving. (dilated 04/28/2023)

## 2024-06-11 NOTE — PROGRESS NOTES
Rx Refill Request Telephone Encounter    Name:  Nicolette Arreola  :  [id number]  Medication Name:    gabapentin (Neurontin) 400 mg capsule     Specific Pharmacy location:    [id number] - [facility] [address] [address] [address]     Date of last appointment:  24  Date of next appointment:  na  Best number to reach patient:  [phone]             orbital cellulitis, etc.   Care not to spread to other eye. Explained risk of worsening infection, and if it should progress despite antibiotics to call 911 immediately. Explained risk of sepsis, amputation, death, etc.   Orders Placed This Encounter   Procedures    01030 - MA VISUAL SCREENING TEST, BILAT     Orders Placed This Encounter   Medications    erythromycin (ROMYCIN) 5 MG/GM ophthalmic ointment     Sig: Place into the right eye 3 times daily for 10 days Nightly. Dispense:  1 Tube     Refill:  0       Return for regularly scheduled appointment with PCP, worsening symptoms, call ASAP for appointment.       Cezar Alcazar MD

## 2024-08-01 ENCOUNTER — PATIENT MESSAGE (OUTPATIENT)
Dept: PRIMARY CARE | Facility: CLINIC | Age: 34
End: 2024-08-01
Payer: COMMERCIAL

## 2024-09-19 ENCOUNTER — OFFICE VISIT (OUTPATIENT)
Dept: URGENT CARE | Age: 34
End: 2024-09-19
Payer: COMMERCIAL

## 2024-09-19 ENCOUNTER — ANCILLARY PROCEDURE (OUTPATIENT)
Dept: URGENT CARE | Age: 34
End: 2024-09-19
Payer: COMMERCIAL

## 2024-09-19 VITALS
HEIGHT: 62 IN | SYSTOLIC BLOOD PRESSURE: 125 MMHG | TEMPERATURE: 98 F | OXYGEN SATURATION: 96 % | WEIGHT: 210 LBS | DIASTOLIC BLOOD PRESSURE: 80 MMHG | HEART RATE: 75 BPM | RESPIRATION RATE: 18 BRPM | BODY MASS INDEX: 38.64 KG/M2

## 2024-09-19 DIAGNOSIS — R05.8 COUGH PRODUCTIVE OF PURULENT SPUTUM: ICD-10-CM

## 2024-09-19 LAB
POC BINAX EXPIRATION: 0
POC BINAX NOW COVID SERIAL NUMBER: 0
POC RAPID INFLUENZA A: NEGATIVE
POC RAPID INFLUENZA B: NEGATIVE
POC SARS-COV-2 AG BINAX: NORMAL

## 2024-09-19 RX ORDER — BROMPHENIRAMINE MALEATE, PSEUDOEPHEDRINE HYDROCHLORIDE, AND DEXTROMETHORPHAN HYDROBROMIDE 2; 30; 10 MG/5ML; MG/5ML; MG/5ML
5 SYRUP ORAL 4 TIMES DAILY PRN
Qty: 120 ML | Refills: 0 | Status: SHIPPED | OUTPATIENT
Start: 2024-09-19 | End: 2024-09-29

## 2024-09-19 ASSESSMENT — ENCOUNTER SYMPTOMS
NUMBNESS: 0
ALLERGIC/IMMUNOLOGIC NEGATIVE: 1
SORE THROAT: 0
GASTROINTESTINAL NEGATIVE: 1
CHILLS: 1
WHEEZING: 0
HEADACHES: 0
HEMATOLOGIC/LYMPHATIC NEGATIVE: 1
WEIGHT LOSS: 0
COLOR CHANGE: 0
EYES NEGATIVE: 1
SINUS PRESSURE: 0
FEVER: 0
FATIGUE: 0
SWEATS: 0
SHORTNESS OF BREATH: 0
MUSCULOSKELETAL NEGATIVE: 1
DIZZINESS: 1
ENDOCRINE NEGATIVE: 1
HEMOPTYSIS: 0
WEAKNESS: 0
APPETITE CHANGE: 0
PSYCHIATRIC NEGATIVE: 1
HEARTBURN: 0
SINUS PAIN: 0
DIAPHORESIS: 0
COUGH: 1
LIGHT-HEADEDNESS: 0
RHINORRHEA: 1

## 2024-09-19 ASSESSMENT — PAIN SCALES - GENERAL: PAINLEVEL: 6

## 2024-09-19 NOTE — PROGRESS NOTES
Subjective   Patient ID: Christianne Jurado is a 34 y.o. female. They present today with a chief complaint of Nasal Congestion, Cough, and Dizziness (Chest & head congestion, cough, dizzy x 2 days).    History of Present Illness  Patient endorses productive cough and nasal congestion.  History right lung agenesis and is concerned she may develop pneumonia.       History provided by:  Patient   used: No    Cough  This is a new problem. The current episode started in the past 7 days. The problem has been unchanged. The problem occurs every few minutes. The cough is Productive of purulent sputum. Associated symptoms include chills, nasal congestion, postnasal drip and rhinorrhea. Pertinent negatives include no chest pain, ear congestion, ear pain, fever, headaches, heartburn, hemoptysis, rash, sore throat, shortness of breath, sweats, weight loss or wheezing. The symptoms are aggravated by lying down. She has tried OTC cough suppressant for the symptoms. The treatment provided no relief.   Dizziness  Associated symptoms: no chest pain, no headaches, no shortness of breath and no weakness        Past Medical History  Allergies as of 09/19/2024 - Reviewed 09/19/2024   Allergen Reaction Noted    Sertraline Anxiety and Other 03/25/2016    Trazodone Insomnia and Other 03/01/2024    Bupropion Anxiety and Rash 12/06/2016    Buspirone Anxiety 12/06/2016    Paroxetine hcl Anxiety and Other 03/25/2016       (Not in a hospital admission)       Past Medical History:   Diagnosis Date    Acute non-recurrent pansinusitis 03/01/2024    Blurry vision, bilateral 11/17/2022    Headache 07/16/2016    Mass of skin of toe of left foot 03/01/2024    Nasal congestion with rhinorrhea 03/01/2024    Optic disc edema 03/01/2024    Pseudotumor 03/01/2024    Right foot pain 03/01/2024    Suspected COVID-19 virus infection 03/01/2024    Tachycardia 03/01/2024    Tinea versicolor 03/25/2016       Past Surgical History:   Procedure  "Laterality Date    APPENDECTOMY      MR HEAD ANGIO WO IV CONTRAST  06/29/2017    MR HEAD ANGIO WO IV CONTRAST [facility]2017 [facility]        reports that she has never smoked. She has never used smokeless tobacco. She reports that she does not use drugs.    Review of Systems  Review of Systems   Constitutional:  Positive for chills. Negative for appetite change, diaphoresis, fatigue, fever and weight loss.   HENT:  Positive for congestion, postnasal drip and rhinorrhea. Negative for ear pain, sinus pressure, sinus pain, sneezing and sore throat.    Eyes: Negative.    Respiratory:  Positive for cough. Negative for hemoptysis, shortness of breath and wheezing.    Cardiovascular:  Negative for chest pain.   Gastrointestinal: Negative.  Negative for heartburn.   Endocrine: Negative.    Genitourinary: Negative.    Musculoskeletal: Negative.    Skin: Negative.  Negative for color change, pallor and rash.   Allergic/Immunologic: Negative.    Neurological:  Positive for dizziness. Negative for weakness, light-headedness, numbness and headaches.   Hematological: Negative.    Psychiatric/Behavioral: Negative.                                    Objective    Vitals:    09/19/24 1119   BP: 125/80   BP Location: Left arm   Patient Position: Sitting   Pulse: 75   Resp: 18   Temp: 36.7 °C (98 °F)   SpO2: 96%   Weight: 95.3 kg (210 lb)   Height: 1.575 m (5' 2\")     No LMP recorded.    Physical Exam  Vitals and nursing note reviewed.   Constitutional:       General: She is not in acute distress.     Appearance: Normal appearance. She is normal weight. She is not ill-appearing, toxic-appearing or diaphoretic.   HENT:      Nose: Congestion and rhinorrhea present.      Mouth/Throat:      Mouth: Mucous membranes are moist.      Pharynx: Oropharynx is clear. No oropharyngeal exudate.   Eyes:      General:         Right eye: No discharge.         Left eye: No discharge.   Cardiovascular:      Rate and Rhythm: Normal rate and regular " rhythm.      Pulses: Normal pulses.      Heart sounds: Normal heart sounds.   Pulmonary:      Effort: Pulmonary effort is normal. No tachypnea, bradypnea, accessory muscle usage, respiratory distress or retractions.      Breath sounds: No stridor. No wheezing, rhonchi or rales.      Comments: Lung sounds normal left.  Faint breath sounds right upper field and absent right middle/lower.  No tracheal deviation, cyanosis, or distress noted.   Chest:      Chest wall: No tenderness.   Musculoskeletal:      Cervical back: Normal range of motion and neck supple. No rigidity or tenderness.   Lymphadenopathy:      Cervical: No cervical adenopathy.   Skin:     General: Skin is warm and dry.      Coloration: Skin is not jaundiced or pale.      Findings: No bruising, erythema or rash.   Neurological:      General: No focal deficit present.      Mental Status: She is alert.         Procedures    Point of Care Test & Imaging Results from this visit  Results for orders placed or performed in visit on 09/19/24   POCT Covid-19 Rapid Antigen   Result Value Ref Range    Binax NOW Covid Serial Number 0     BINAX NOW Covid Expiration 0     POC ODETTE-COV-2 AG  Presumptive negative test for SARS-CoV-2 (no antigen detected)     Presumptive negative test for SARS-CoV-2 (no antigen detected)   POCT Influenza A/B manually resulted   Result Value Ref Range    POC Rapid Influenza A Negative Negative    POC Rapid Influenza B Negative Negative      No results found.    Diagnostic study results (if any) were reviewed by TERRY Kelley.    Assessment/Plan   Allergies, medications, history, and pertinent labs/EKGs/Imaging reviewed by TERRY Kelley.     Medical Decision Making      Orders and Diagnoses  Diagnoses and all orders for this visit:  Cough productive of purulent sputum  -     POCT Covid-19 Rapid Antigen  -     POCT Influenza A/B manually resulted  -     XR chest 2 views; Future      Medical Admin Record      Patient  disposition: Home    Electronically signed by TERRY Kelley  12:20 PM

## 2024-09-19 NOTE — LETTER
September 19, 2024     Patient: Christianne Jurado   YOB: 1990   Date of Visit: 9/19/2024       To Whom It May Concern:    Christianne Jurado was seen in my clinic on 9/19/2024 at 10:40 am. Please excuse Christianne for her absence from work on this day to make the appointment.  Return to work Monday, 9/23/2024.    If you have any questions or concerns, please don't hesitate to call.         Sincerely,         Wily Bravo, DIVYA-CNP        CC: No Recipients

## 2024-11-26 ENCOUNTER — ANCILLARY PROCEDURE (OUTPATIENT)
Dept: URGENT CARE | Age: 34
End: 2024-11-26
Payer: COMMERCIAL

## 2024-11-26 ENCOUNTER — OFFICE VISIT (OUTPATIENT)
Dept: URGENT CARE | Age: 34
End: 2024-11-26
Payer: COMMERCIAL

## 2024-11-26 VITALS
SYSTOLIC BLOOD PRESSURE: 125 MMHG | OXYGEN SATURATION: 98 % | HEART RATE: 70 BPM | TEMPERATURE: 97 F | RESPIRATION RATE: 15 BRPM | DIASTOLIC BLOOD PRESSURE: 85 MMHG | BODY MASS INDEX: 37.49 KG/M2 | WEIGHT: 205 LBS

## 2024-11-26 DIAGNOSIS — J40 BRONCHITIS: Primary | ICD-10-CM

## 2024-11-26 DIAGNOSIS — R05.9 COUGH IN ADULT PATIENT: ICD-10-CM

## 2024-11-26 PROCEDURE — 99213 OFFICE O/P EST LOW 20 MIN: CPT

## 2024-11-26 PROCEDURE — 71046 X-RAY EXAM CHEST 2 VIEWS: CPT

## 2024-11-26 PROCEDURE — 1036F TOBACCO NON-USER: CPT

## 2024-11-26 RX ORDER — BROMPHENIRAMINE MALEATE, PSEUDOEPHEDRINE HYDROCHLORIDE, AND DEXTROMETHORPHAN HYDROBROMIDE 2; 30; 10 MG/5ML; MG/5ML; MG/5ML
10 SYRUP ORAL 4 TIMES DAILY PRN
Qty: 120 ML | Refills: 0 | Status: SHIPPED | OUTPATIENT
Start: 2024-11-26 | End: 2024-12-06

## 2024-11-26 RX ORDER — AZITHROMYCIN 250 MG/1
TABLET, FILM COATED ORAL
Qty: 6 TABLET | Refills: 0 | Status: SHIPPED | OUTPATIENT
Start: 2024-11-26 | End: 2024-12-01

## 2024-11-26 ASSESSMENT — ENCOUNTER SYMPTOMS: COUGH: 1

## 2024-11-26 NOTE — PROGRESS NOTES
Subjective   Patient ID: Christianne Jurado is a 34 y.o. female. They present today with a chief complaint of Cough (Cough one week. Worried about pneumonia. Has congestion. Brown mucus).    History of Present Illness  Subjective  Christianne Jurado is a 34 y.o. female here for evaluation of a cough. The cough is productive of green/yellow sputum, with shortness of breath during the cough, chest is painful during coughing, harsh, worsening over time and is aggravated by nothing. Onset of symptoms was 7 days ago, gradually worsening since that time. Associated symptoms include chills, fever, and sputum production. Patient does not have a history of asthma. Patient has not had recent travel. Patient does not have a history of smoking. Patient has not had a previous chest x-ray.     ROS:     Gen: Endorses fever.  No fatigue    ENT: No hearing changes, pain, epistaxis, congestion     Cardiac: No chest pain     Pulmonary: Endorses productive cough.  No shortness of breath, pleuritic pain    Heme/lymph: No swollen glands     Skin: No rashes, pruritus, lumps, lesions.     Review of systems is otherwise negative unless stated above or in history of present illness.         History provided by:  Patient   used: No    Cough        Past Medical History  Allergies as of 11/26/2024 - Reviewed 11/26/2024   Allergen Reaction Noted    Sertraline Anxiety and Other 03/25/2016    Trazodone Insomnia and Other 03/01/2024    Bupropion Anxiety and Rash 12/06/2016    Buspirone Anxiety 12/06/2016    Paroxetine hcl Anxiety and Other 03/25/2016       (Not in a hospital admission)       Past Medical History:   Diagnosis Date    Acute non-recurrent pansinusitis 03/01/2024    Blurry vision, bilateral 11/17/2022    Headache 07/16/2016    Mass of skin of toe of left foot 03/01/2024    Nasal congestion with rhinorrhea 03/01/2024    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 She has never used smokeless tobacco. She reports that she does not use drugs.    Review of Systems  Review of Systems   Respiratory:  Positive for cough.                                   Objective    Vitals:    11/26/24 1751   BP: 125/85   Pulse: 70   Resp: 15   Temp: 36.1 °C (97 °F)   SpO2: 98%   Weight: 93 kg (205 lb)     No LMP recorded.    Physical Exam  Vitals and nursing note reviewed.   Constitutional:       General: She is not in acute distress.     Appearance: Normal appearance. She is normal weight. She is not ill-appearing, toxic-appearing or diaphoretic.   Cardiovascular:      Rate and Rhythm: Normal rate and regular rhythm.      Pulses: Normal pulses.      Heart sounds: Normal heart sounds.   Pulmonary:      Effort: Pulmonary effort is normal. No respiratory distress.      Breath sounds: Normal breath sounds. No stridor. No wheezing, rhonchi or rales.   Chest:      Chest wall: No tenderness.   Musculoskeletal:         General: Normal range of motion.      Cervical back: No rigidity or tenderness.   Lymphadenopathy:      Cervical: No cervical adenopathy.   Skin:     General: Skin is warm and dry.      Coloration: Skin is not pale.      Findings: No erythema.   Neurological:      General: No focal deficit present.      Mental Status: She is alert.         Procedures    Point of Care Test & Imaging Results from this visit  No results found for this visit on 11/26/24.   XR chest 2 views    Result Date: 11/26/2024  Interpreted By:  Lucas Simmons, STUDY: XR CHEST 2 VIEWS   INDICATION: Signs/Symptoms:cough.   COMPARISON: September 19   Previous chest radiographs from November 29, 2019   ACCESSION NUMBER(S):  [id number]   ORDERING CLINICIAN: INESSA EVANS   FINDINGS: Right mid lung airspace disease slightly worsened from prior study suggestive of progression of pneumonia.   Additionally, there is the suggestion of some significant right lung volume loss with what looks to be some shifting of the mediastinum and the trachea to the right which could suggest a component of underlying endobronchial lesion.       Slight worsening right lung consolidation with a suspected sizable component of volume loss. Findings raise concern for possible endobronchial lesion/obstruction with some postobstructive pneumonitis. This is a longstanding finding however and could suggest an underlying chronic/anatomic abnormality.   Signed by: Lucas Simmons 11/26/2024 6:04 PM Dictation workstation:   [id number]     Diagnostic study results (if any) were reviewed by TERRY Kelley.    Assessment/Plan   Allergies, medications, history, and pertinent labs/EKGs/Imaging reviewed by TERRY Kelley.     Medical Decision Making    Urgent Care Course: Patient presents to the ED with rhinorrhea, cough, chest congestion. Patient is afebrile, hemodynamically stable.  Patient denies fever, n/v, cp, sob, ab pain, dysuria, diarrhea.  CXR negative however shows peribronchial thickening indicative of bronchitis.  Patient without any obvious infiltrate.  Given longevity of symptoms will treat patient for bronchitis.  Patient given prescription for azithromycin and Bromfed DM.  Patient given pneumonia warning signs and will f/u with pcp.   Prior external records reviewed: Outpatient records  Independent Hx provided by: Patient  Tests completed:   Med Rx considered but ultimately not given: Steroids   Dx tests considered but ultimately not ordered: RSV, COVID, flu  Social determinant that may affects healthcare: None  Pt's case/impression summarized and discussed with: Patient  Likely Dx given clinical picture: Bronchitis  Although not an  exhaustive list of Differential Diagnosis (though considered), patient's HPI, PE, and other findings are not suggestive of: Pneumonia, pneumothorax, hemothorax, ACS, PE, bronchospasm, asthma exacerbation  Patient at time of discharge was clinically well-appearing and HDS for outpatient management. The patient and/or family was given the opportunity to ask questions prior to discharge, understood my verbal discussion of the plans for treatment, expected course, indications to return to  or seek further treatment in ED, and the need for timely follow up as directed.    Condition: Stable  Disposition: Discharged    Orders and Diagnoses  Diagnoses and all orders for this visit:  Cough in adult patient  -     XR chest 2 views; Future      Medical Admin Record      Patient disposition: Home    Electronically signed by TERRY Kelley  6:24 PM

## 2025-01-10 ENCOUNTER — APPOINTMENT (OUTPATIENT)
Dept: PRIMARY CARE | Facility: CLINIC | Age: 35
End: 2025-01-10
Payer: COMMERCIAL

## 2025-06-08 ENCOUNTER — APPOINTMENT (OUTPATIENT)
Dept: CARDIOLOGY | Facility: HOSPITAL | Age: 35
End: 2025-06-08
Payer: COMMERCIAL

## 2025-06-08 ENCOUNTER — HOSPITAL ENCOUNTER (EMERGENCY)
Facility: HOSPITAL | Age: 35
Discharge: HOME | End: 2025-06-08
Attending: STUDENT IN AN ORGANIZED HEALTH CARE EDUCATION/TRAINING PROGRAM
Payer: COMMERCIAL

## 2025-06-08 ENCOUNTER — APPOINTMENT (OUTPATIENT)
Dept: RADIOLOGY | Facility: HOSPITAL | Age: 35
End: 2025-06-08
Payer: COMMERCIAL

## 2025-06-08 VITALS
HEART RATE: 75 BPM | WEIGHT: 210 LBS | DIASTOLIC BLOOD PRESSURE: 83 MMHG | TEMPERATURE: 97.7 F | RESPIRATION RATE: 18 BRPM | SYSTOLIC BLOOD PRESSURE: 164 MMHG | OXYGEN SATURATION: 97 % | HEIGHT: 62 IN | BODY MASS INDEX: 38.64 KG/M2

## 2025-06-08 DIAGNOSIS — R51.9 NONINTRACTABLE HEADACHE, UNSPECIFIED CHRONICITY PATTERN, UNSPECIFIED HEADACHE TYPE: Primary | ICD-10-CM

## 2025-06-08 LAB
ALBUMIN SERPL BCP-MCNC: 4.5 G/DL (ref 3.4–5)
ALP SERPL-CCNC: 58 U/L (ref 33–110)
ALT SERPL W P-5'-P-CCNC: 21 U/L (ref 7–45)
ANION GAP SERPL CALC-SCNC: 11 MMOL/L (ref 10–20)
AST SERPL W P-5'-P-CCNC: 15 U/L (ref 9–39)
ATRIAL RATE: 71 BPM
B-HCG SERPL-ACNC: <2 MIU/ML
BASOPHILS # BLD AUTO: 0.03 X10*3/UL (ref 0–0.1)
BASOPHILS NFR BLD AUTO: 0.4 %
BILIRUB SERPL-MCNC: 0.6 MG/DL (ref 0–1.2)
BUN SERPL-MCNC: 16 MG/DL (ref 6–23)
CALCIUM SERPL-MCNC: 9.3 MG/DL (ref 8.6–10.3)
CARDIAC TROPONIN I PNL SERPL HS: 13 NG/L (ref 0–13)
CHLORIDE SERPL-SCNC: 103 MMOL/L (ref 98–107)
CO2 SERPL-SCNC: 26 MMOL/L (ref 21–32)
CREAT SERPL-MCNC: 0.7 MG/DL (ref 0.5–1.05)
EGFRCR SERPLBLD CKD-EPI 2021: >90 ML/MIN/1.73M*2
EOSINOPHIL # BLD AUTO: 0.04 X10*3/UL (ref 0–0.7)
EOSINOPHIL NFR BLD AUTO: 0.5 %
ERYTHROCYTE [DISTWIDTH] IN BLOOD BY AUTOMATED COUNT: 13.5 % (ref 11.5–14.5)
GLUCOSE SERPL-MCNC: 92 MG/DL (ref 74–99)
HCT VFR BLD AUTO: 44.2 % (ref 36–46)
HGB BLD-MCNC: 14.7 G/DL (ref 12–16)
IMM GRANULOCYTES # BLD AUTO: 0.02 X10*3/UL (ref 0–0.7)
IMM GRANULOCYTES NFR BLD AUTO: 0.2 % (ref 0–0.9)
LYMPHOCYTES # BLD AUTO: 2.2 X10*3/UL (ref 1.2–4.8)
LYMPHOCYTES NFR BLD AUTO: 26.3 %
MAGNESIUM SERPL-MCNC: 1.75 MG/DL (ref 1.6–2.4)
MCH RBC QN AUTO: 28.7 PG (ref 26–34)
MCHC RBC AUTO-ENTMCNC: 33.3 G/DL (ref 32–36)
MCV RBC AUTO: 86 FL (ref 80–100)
MONOCYTES # BLD AUTO: 0.34 X10*3/UL (ref 0.1–1)
MONOCYTES NFR BLD AUTO: 4.1 %
NEUTROPHILS # BLD AUTO: 5.73 X10*3/UL (ref 1.2–7.7)
NEUTROPHILS NFR BLD AUTO: 68.5 %
NRBC BLD-RTO: 0 /100 WBCS (ref 0–0)
P AXIS: 41 DEGREES
P OFFSET: 200 MS
P ONSET: 143 MS
PLATELET # BLD AUTO: 293 X10*3/UL (ref 150–450)
POTASSIUM SERPL-SCNC: 4 MMOL/L (ref 3.5–5.3)
PR INTERVAL: 148 MS
PROT SERPL-MCNC: 7.9 G/DL (ref 6.4–8.2)
Q ONSET: 217 MS
QRS COUNT: 12 BEATS
QRS DURATION: 96 MS
QT INTERVAL: 400 MS
QTC CALCULATION(BAZETT): 434 MS
QTC FREDERICIA: 423 MS
R AXIS: 68 DEGREES
RBC # BLD AUTO: 5.12 X10*6/UL (ref 4–5.2)
SODIUM SERPL-SCNC: 136 MMOL/L (ref 136–145)
T AXIS: 55 DEGREES
T OFFSET: 417 MS
VENTRICULAR RATE: 71 BPM
WBC # BLD AUTO: 8.4 X10*3/UL (ref 4.4–11.3)

## 2025-06-08 PROCEDURE — 99285 EMERGENCY DEPT VISIT HI MDM: CPT | Mod: 25 | Performed by: STUDENT IN AN ORGANIZED HEALTH CARE EDUCATION/TRAINING PROGRAM

## 2025-06-08 PROCEDURE — 83735 ASSAY OF MAGNESIUM: CPT | Performed by: PHYSICIAN ASSISTANT

## 2025-06-08 PROCEDURE — 93005 ELECTROCARDIOGRAM TRACING: CPT

## 2025-06-08 PROCEDURE — 70450 CT HEAD/BRAIN W/O DYE: CPT

## 2025-06-08 PROCEDURE — 85025 COMPLETE CBC W/AUTO DIFF WBC: CPT | Performed by: PHYSICIAN ASSISTANT

## 2025-06-08 PROCEDURE — 84484 ASSAY OF TROPONIN QUANT: CPT | Performed by: PHYSICIAN ASSISTANT

## 2025-06-08 PROCEDURE — 96375 TX/PRO/DX INJ NEW DRUG ADDON: CPT

## 2025-06-08 PROCEDURE — 96361 HYDRATE IV INFUSION ADD-ON: CPT

## 2025-06-08 PROCEDURE — 2500000004 HC RX 250 GENERAL PHARMACY W/ HCPCS (ALT 636 FOR OP/ED): Mod: JZ | Performed by: PHYSICIAN ASSISTANT

## 2025-06-08 PROCEDURE — 84702 CHORIONIC GONADOTROPIN TEST: CPT | Performed by: PHYSICIAN ASSISTANT

## 2025-06-08 PROCEDURE — 36415 COLL VENOUS BLD VENIPUNCTURE: CPT | Performed by: PHYSICIAN ASSISTANT

## 2025-06-08 PROCEDURE — 84075 ASSAY ALKALINE PHOSPHATASE: CPT | Performed by: PHYSICIAN ASSISTANT

## 2025-06-08 PROCEDURE — 70450 CT HEAD/BRAIN W/O DYE: CPT | Performed by: STUDENT IN AN ORGANIZED HEALTH CARE EDUCATION/TRAINING PROGRAM

## 2025-06-08 PROCEDURE — 2500000001 HC RX 250 WO HCPCS SELF ADMINISTERED DRUGS (ALT 637 FOR MEDICARE OP): Performed by: PHYSICIAN ASSISTANT

## 2025-06-08 PROCEDURE — 96374 THER/PROPH/DIAG INJ IV PUSH: CPT

## 2025-06-08 RX ORDER — KETOROLAC TROMETHAMINE 30 MG/ML
30 INJECTION, SOLUTION INTRAMUSCULAR; INTRAVENOUS ONCE
Status: COMPLETED | OUTPATIENT
Start: 2025-06-08 | End: 2025-06-08

## 2025-06-08 RX ORDER — DIPHENHYDRAMINE HYDROCHLORIDE 50 MG/ML
25 INJECTION, SOLUTION INTRAMUSCULAR; INTRAVENOUS ONCE
Status: COMPLETED | OUTPATIENT
Start: 2025-06-08 | End: 2025-06-08

## 2025-06-08 RX ORDER — ACETAMINOPHEN 325 MG/1
975 TABLET ORAL ONCE
Status: COMPLETED | OUTPATIENT
Start: 2025-06-08 | End: 2025-06-08

## 2025-06-08 RX ORDER — ONDANSETRON HYDROCHLORIDE 2 MG/ML
4 INJECTION, SOLUTION INTRAVENOUS ONCE
Status: COMPLETED | OUTPATIENT
Start: 2025-06-08 | End: 2025-06-08

## 2025-06-08 RX ADMIN — ACETAMINOPHEN 975 MG: 325 TABLET ORAL at 11:19

## 2025-06-08 RX ADMIN — METHYLPREDNISOLONE SODIUM SUCCINATE 125 MG: 125 INJECTION, POWDER, FOR SOLUTION INTRAMUSCULAR; INTRAVENOUS at 10:16

## 2025-06-08 RX ADMIN — DIPHENHYDRAMINE HYDROCHLORIDE 25 MG: 50 INJECTION, SOLUTION INTRAMUSCULAR; INTRAVENOUS at 10:16

## 2025-06-08 RX ADMIN — KETOROLAC TROMETHAMINE 30 MG: 30 INJECTION, SOLUTION INTRAMUSCULAR at 11:19

## 2025-06-08 RX ADMIN — ONDANSETRON 4 MG: 2 INJECTION INTRAMUSCULAR; INTRAVENOUS at 10:16

## 2025-06-08 RX ADMIN — SODIUM CHLORIDE 500 ML: 0.9 INJECTION, SOLUTION INTRAVENOUS at 10:16

## 2025-06-08 ASSESSMENT — COLUMBIA-SUICIDE SEVERITY RATING SCALE - C-SSRS
2. HAVE YOU ACTUALLY HAD ANY THOUGHTS OF KILLING YOURSELF?: NO
1. IN THE PAST MONTH, HAVE YOU WISHED YOU WERE DEAD OR WISHED YOU COULD GO TO SLEEP AND NOT WAKE UP?: NO
6. HAVE YOU EVER DONE ANYTHING, STARTED TO DO ANYTHING, OR PREPARED TO DO ANYTHING TO END YOUR LIFE?: NO

## 2025-06-08 ASSESSMENT — PAIN SCALES - GENERAL
PAINLEVEL_OUTOF10: 6
PAINLEVEL_OUTOF10: 6

## 2025-06-08 ASSESSMENT — PAIN DESCRIPTION - LOCATION
LOCATION: HEAD
LOCATION: HEAD

## 2025-06-08 ASSESSMENT — PAIN DESCRIPTION - FREQUENCY: FREQUENCY: CONSTANT/CONTINUOUS

## 2025-06-08 ASSESSMENT — PAIN DESCRIPTION - PAIN TYPE
TYPE: ACUTE PAIN
TYPE: ACUTE PAIN

## 2025-06-08 ASSESSMENT — PAIN DESCRIPTION - PROGRESSION: CLINICAL_PROGRESSION: NOT CHANGED

## 2025-06-08 ASSESSMENT — PAIN DESCRIPTION - DESCRIPTORS
DESCRIPTORS: ACHING
DESCRIPTORS: ACHING;HEADACHE

## 2025-06-08 ASSESSMENT — PAIN - FUNCTIONAL ASSESSMENT
PAIN_FUNCTIONAL_ASSESSMENT: 0-10
PAIN_FUNCTIONAL_ASSESSMENT: 0-10

## 2025-06-08 NOTE — ED PROVIDER NOTES
HPI   Chief Complaint   Patient presents with    Headache     Migraine since yesterday causing right sided numbness       35-year-old female with a history of migraines presenting to the ER today with a left frontal headache that began yesterday.  Patient tells me that she has a history of migraines and she normally gets a headache throughout the week before her period.  She is due for her period next week so the timing of this headache is consistent with her previous history.  She states there was no fall or injury and she has not been sick with fever, congestion, cough, runny nose or sore throat.  She has not had any rashes or neck pain or fever.  Patient states that she has a left frontal headache which is a typical location for her migraines and before onset of her migraine she normally sees an aura.  She states that this happened to her yesterday but she had some numbness in her left arm.  She states at that time she did not have any weakness and her symptoms resolved.  Now this morning she woke up, saw an aura again and knew that she was going to have a headache so she took some Advil.  She states that she did have onset of left frontal headache but now also associated with numbness in the right side of her face and right arm.  She did not have any issues with her leg and she is not having any weakness when this occurred.  She states the numbness lasted 30 minutes and resolved on its own and has not returned.  She did have an episode of vomiting yesterday when the symptoms occurred because she felt very anxious.  Currently she does not feel nauseous.  She has no concerns for pregnancy.  No further complaints this time.  She does not smoke or use any alcohol or drugs.      History provided by:  Patient          Patient History   Medical History[1]  Surgical History[2]  Family History[3]  Social History[4]    Physical Exam   ED Triage Vitals [06/08/25 0912]   Temperature Heart Rate Respirations BP   36.5 °C  (97.7 °F) 75 18 164/83      Pulse Ox Temp Source Heart Rate Source Patient Position   97 % Temporal Monitor Sitting      BP Location FiO2 (%)     Right arm --       Physical Exam  Constitutional:       General: She is not in acute distress.  HENT:      Head: Normocephalic and atraumatic.      Mouth/Throat:      Mouth: Mucous membranes are moist.      Pharynx: Oropharynx is clear. No oropharyngeal exudate or posterior oropharyngeal erythema.   Eyes:      Extraocular Movements: Extraocular movements intact.      Conjunctiva/sclera: Conjunctivae normal.      Pupils: Pupils are equal, round, and reactive to light.      Comments: No nystagmus.   Cardiovascular:      Rate and Rhythm: Normal rate and regular rhythm.      Pulses: Normal pulses.      Heart sounds: Normal heart sounds.   Pulmonary:      Effort: Pulmonary effort is normal.      Breath sounds: Normal breath sounds.   Musculoskeletal:      Cervical back: Normal range of motion and neck supple. No rigidity or tenderness.      Comments: Normal gait and strength tone. 5/5 strength and sensation to extremities without signs of trauma. No calf tenderness or swelling bilaterally. NVI   Lymphadenopathy:      Cervical: No cervical adenopathy.   Skin:     General: Skin is warm.      Capillary Refill: Capillary refill takes less than 2 seconds.   Neurological:      General: No focal deficit present.      Mental Status: She is alert and oriented to person, place, and time.      Cranial Nerves: No cranial nerve deficit.      Sensory: No sensory deficit.      Motor: No weakness.      Coordination: Coordination normal.      Gait: Gait normal.      Comments: Speech normal.  No ataxia.   Psychiatric:         Mood and Affect: Mood normal.         Behavior: Behavior normal.         ED Course & MDM   Diagnoses as of 06/08/25 1210   Nonintractable headache, unspecified chronicity pattern, unspecified headache type                 No data recorded     Za Coma Scale Score: 15  (06/08/25 0912 : Alexus Moore RN)       NIH Stroke Scale: 0 (06/08/25 0938 : Francoise Cisnreos PA-C)                   Medical Decision Making  35-year-old female with a history of migraines presenting to the ER today with left frontal headache that started yesterday.  Patient normally gets migraines a week before her period so the timing of this is normal and the location of her migraine is normal.  She had an onset of aura before her headache which is also typical for her migraines.  Yesterday she had an episode of numbness and only the left arm.  Now today she had a 30-minute episode of numbness in the right side of her face and her right arm.  She did not have associated weakness with this.  She did not have any other visual disturbances.  Patient states that she felt very anxious when the symptoms were going on yesterday and she did vomit once.  She otherwise denies any further complaints and arrives afebrile with stable vital signs.  On exam she is nontoxic-appearing and resting comfortably.  She is mentating appropriately and her speech is clear.  On my exam heart RRR, lungs are clear.  There is no nuchal rigidity or meningismal signs.  There is no signs of trauma on exam.  She is neurologically intact without any deficits.  NIH is 0.  She is not endorsing any current numbness or weakness, only a left frontal headache.  Workup is initiated and the patient agreed with this plan.  I discussed the case with my attending who agrees with assessment.    ECG per my interpretation normal sinus rhythm at 71 bpm without acute ST-T wave changes or arrhythmia.  CT head does not show any acute intracranial abnormality.  CBC with stable hemoglobin, no leukocytosis.  CMP without acute electrolyte abnormality or renal insufficiency.  Magnesium 1.75.  Pregnancy test is negative and troponin is 13.  Labs are otherwise within normal limits.  Patient's states that her headache has improved a little bit and she has not had  any recurrence of the numbness in her face or arm.  She would like something additional for her migraine, medication is ordered and she will be reassessed.    After Toradol and Tylenol patient feels much improved and her headache has resolved.  She has not had any other symptoms develop and her NIH remains 0.  She was seen and evaluated today by my attending.  We did discuss results with the patient.  She was feeling very anxious and states she feels this could have caused the numbness however symptoms also could be due to complex migraine.  Patient does feel safe with discharge at this time and we will have her get scheduled with a neurologist prior to discharge.  Patient very much agreed with this plan.  I did discuss with her treatment plan home-going however and I did discuss warning signs to return to the emergency department and she expressed understanding and agreed with the plan of care today.      Labs Reviewed   MAGNESIUM - Normal       Result Value    Magnesium 1.75     COMPREHENSIVE METABOLIC PANEL - Normal    Glucose 92      Sodium 136      Potassium 4.0      Chloride 103      Bicarbonate 26      Anion Gap 11      Urea Nitrogen 16      Creatinine 0.70      eGFR >90      Calcium 9.3      Albumin 4.5      Alkaline Phosphatase 58      Total Protein 7.9      AST 15      Bilirubin, Total 0.6      ALT 21     TROPONIN I, HIGH SENSITIVITY - Normal    Troponin I, High Sensitivity 13      Narrative:     Less than 99th percentile of normal range cutoff-  Female and children under 18 years old <14 ng/L; Male <21 ng/L: Negative  Repeat testing should be performed if clinically indicated.     Female and children under 18 years old 14-50 ng/L; Male 21-50 ng/L:  Consistent with possible cardiac damage and possible increased clinical   risk. Serial measurements may help to assess extent of myocardial damage.     >50 ng/L: Consistent with cardiac damage, increased clinical risk and  myocardial infarction. Serial  measurements may help assess extent of   myocardial damage.      NOTE: Children less than 1 year old may have higher baseline troponin   levels and results should be interpreted in conjunction with the overall   clinical context.     NOTE: Troponin I testing is performed using a different   testing methodology at [facility] than at other   [facility]. Direct result comparisons should only   be made within the same method.   HUMAN CHORIONIC GONADOTROPIN, SERUM QUANTITATIVE - Normal    HCG, Beta-Quantitative <2      Narrative:      Total HCG measurement is performed using the [employer] Access   Immunoassay which detects intact HCG and free beta HCG subunit.    This test is not indicated for use as a tumor marker.   HCG testing is performed using a different test methodology at [facility] than other [facility] hospitals. Direct result comparison   should only be made within the same method.       CBC WITH AUTO DIFFERENTIAL    WBC 8.4      nRBC 0.0      RBC 5.12      Hemoglobin 14.7      Hematocrit 44.2      MCV 86      MCH 28.7      MCHC 33.3      RDW 13.5      Platelets 293      Neutrophils % 68.5      Immature Granulocytes %, Automated 0.2      Lymphocytes % 26.3      Monocytes % 4.1      Eosinophils % 0.5      Basophils % 0.4      Neutrophils Absolute 5.73      Immature Granulocytes Absolute, Automated 0.02      Lymphocytes Absolute 2.20      Monocytes Absolute 0.34      Eosinophils Absolute 0.04      Basophils Absolute 0.03         CT head wo IV contrast   Final Result        No CT evidence of acute intracranial abnormality.        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    Pseudotumor 03/01/2024    Right foot pain 03/01/2024    Suspected COVID-19 virus infection 03/01/2024    Tachycardia 03/01/2024    Tinea versicolor 03/25/2016   [2]   Past Surgical History:  Procedure Laterality Date    APPENDECTOMY      MR HEAD ANGIO WO IV CONTRAST  06/29/2017    MR HEAD ANGIO WO IV CONTRAST 6/29/2017 [facility] CLINICAL LEGACY   [3]   Family History  Problem Relation Name Age of Onset    Diabetes Mother      Hypertension Mother      Hyperlipidemia Mother      Hypertension Father      Hyperlipidemia Father     [4]   Social History  Tobacco Use    Smoking status: Never    Smokeless tobacco: Never   Substance Use Topics    Alcohol use: Not on file    Drug use: Never        Francoise Cisneros PA-C  06/08/25 1212

## 2025-06-13 ENCOUNTER — OFFICE VISIT (OUTPATIENT)
Dept: PRIMARY CARE | Facility: CLINIC | Age: 35
End: 2025-06-13
Payer: COMMERCIAL

## 2025-06-13 VITALS
BODY MASS INDEX: 40.48 KG/M2 | SYSTOLIC BLOOD PRESSURE: 132 MMHG | HEIGHT: 62 IN | DIASTOLIC BLOOD PRESSURE: 84 MMHG | WEIGHT: 220 LBS | HEART RATE: 80 BPM | TEMPERATURE: 97.4 F | RESPIRATION RATE: 20 BRPM

## 2025-06-13 DIAGNOSIS — G93.2 PSEUDOTUMOR CEREBRI: ICD-10-CM

## 2025-06-13 DIAGNOSIS — Q25.79: ICD-10-CM

## 2025-06-13 DIAGNOSIS — G43.009 MIGRAINE WITHOUT AURA AND WITHOUT STATUS MIGRAINOSUS, NOT INTRACTABLE: Primary | ICD-10-CM

## 2025-06-13 DIAGNOSIS — Z09 FOLLOW-UP EXAM: ICD-10-CM

## 2025-06-13 DIAGNOSIS — J45.20 MILD INTERMITTENT ASTHMA WITHOUT STATUS ASTHMATICUS WITHOUT COMPLICATION (HHS-HCC): ICD-10-CM

## 2025-06-13 DIAGNOSIS — Z00.00 HEALTHCARE MAINTENANCE: ICD-10-CM

## 2025-06-13 DIAGNOSIS — E66.01 MORBID OBESITY WITH BMI OF 40.0-44.9, ADULT (MULTI): ICD-10-CM

## 2025-06-13 PROCEDURE — 99214 OFFICE O/P EST MOD 30 MIN: CPT | Performed by: FAMILY MEDICINE

## 2025-06-13 RX ORDER — SUMATRIPTAN SUCCINATE 100 MG/1
100 TABLET ORAL ONCE AS NEEDED
Qty: 9 TABLET | Refills: 5 | Status: SHIPPED | OUTPATIENT
Start: 2025-06-13 | End: 2026-06-13

## 2025-06-13 RX ORDER — ALPRAZOLAM 0.25 MG/1
0.25 TABLET ORAL NIGHTLY PRN
COMMUNITY

## 2025-06-13 ASSESSMENT — PATIENT HEALTH QUESTIONNAIRE - PHQ9
1. LITTLE INTEREST OR PLEASURE IN DOING THINGS: NOT AT ALL
SUM OF ALL RESPONSES TO PHQ9 QUESTIONS 1 AND 2: 0
2. FEELING DOWN, DEPRESSED OR HOPELESS: NOT AT ALL

## 2025-06-13 ASSESSMENT — ENCOUNTER SYMPTOMS
NAUSEA: 0
HEADACHES: 1
WEAKNESS: 0
FATIGUE: 1
SHORTNESS OF BREATH: 0
DIARRHEA: 0
NUMBNESS: 0
PALPITATIONS: 0
WHEEZING: 0
DIFFICULTY URINATING: 0
COUGH: 0
VOMITING: 0

## 2025-06-13 NOTE — ASSESSMENT & PLAN NOTE
Usually assoc with menses  Will try imitrex  Keep appt with neuro  Pt needs px/bw, will schedule and have med check at same time

## 2025-06-13 NOTE — PROGRESS NOTES
"Subjective   Patient ID: Christianne Jurado is a 35 y.o. female who presents for Follow-up ([facility] ER follow up. Pt went in on 6/8/25 for Rt sided face and arm numbness with and headach (front of head). CT of head, EKG, and BW. Everything came back normal. Pt was given Toradol through IV. Pt was told to follow up with pcp and neurology. She couldn't get in with Neuro untl October. She has had 2 of the same headaches since going to the ER. She layed down in a dark room. She does get sensitive to light and noise when she has the headache. ).    HPI     Review of Systems   Constitutional:  Positive for fatigue.   Respiratory:  Negative for cough, shortness of breath and wheezing.         Hx of childhood asthma, no meds. Has een stable   Cardiovascular:  Negative for chest pain and palpitations.        Born with 1 lung  Pt sees pulm q 3 yrs  Pt sees card   Gastrointestinal:  Negative for diarrhea, nausea and vomiting.   Genitourinary:  Negative for difficulty urinating.   Neurological:  Positive for headaches. Negative for weakness and numbness.        [place] [facility] [place]  See emr for complete notes  Pt has never seen neuro in the past  No FH migraines  Pt has had h/a for yrs, just took tylenol and advil  H/a 's worse now  Had visual changes, light and noise sens, pain was frontal and the left side  Has n/v with h/a  Never had rx medication       Objective   /84   Pulse 80   Temp 36.3 °C (97.4 °F)   Resp 20   Ht 1.575 m (5' 2\")   Wt 99.8 kg (220 lb)   LMP 05/13/2025   BMI 40.24 kg/m²     Physical Exam  Vitals and nursing note reviewed.   Constitutional:       General: She is not in acute distress.     Appearance: Normal appearance.   HENT:      Head: Normocephalic and atraumatic.   Cardiovascular:      Rate and Rhythm: Normal rate and regular rhythm.      Heart sounds: Normal heart sounds.   Pulmonary:      Breath sounds: Normal breath sounds.   Skin:     General: Skin is warm and dry.   Neurological:      " General: No focal deficit present.      Mental Status: She is alert and oriented to person, place, and time.      Cranial Nerves: No cranial nerve deficit.      Sensory: No sensory deficit.      Motor: No weakness.      Gait: Gait normal.         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    Pt sees card amd pulm  Has been stable         Asthma without status asthmaticus ([facility]) J45.909    Pt currentl on no meds  Has been stable         Morbid obesity with BMI of 40.0-44.9, adult (Multi) E66.01, Z68.41    Advise healthy diet and exercise         Follow-up exam Z09    Pt seen  [place] [facility] [place]   Dx migraine h/a, nmbness  EKG ,ct head ,bw all normal  Pt was given toradol, tylenol, benedryl,ondansetron and steroid  Pt referred to neuro  Here for f/up         Migraine without aura and without status migrainosus, not intractable - Primary G43.009    Usually assoc with menses  Will try imitrex  Keep appt with neuro  Pt needs px/bw, will schedule and have med check at same time         Relevant Medications    SUMAtriptan (Imitrex) 100 mg tablet    Healthcare maintenance Z00.00    Relevant Orders    Follow Up In [facility] - PCP - Health Maintenance

## 2025-06-13 NOTE — ASSESSMENT & PLAN NOTE
Pt seen  at [facility] 6/8/25   Dx migraine h/a, nmbness  EKG ,ct head ,bw all normal  Pt was given toradol, tylenol, benedryl,ondansetron and steroid  Pt referred to neuro  Here for f/up

## 2025-06-16 DIAGNOSIS — G43.009 MIGRAINE WITHOUT AURA AND WITHOUT STATUS MIGRAINOSUS, NOT INTRACTABLE: ICD-10-CM

## 2025-06-16 DIAGNOSIS — R11.0 NAUSEA: ICD-10-CM

## 2025-06-16 RX ORDER — ONDANSETRON 8 MG/1
8 TABLET, ORALLY DISINTEGRATING ORAL 3 TIMES DAILY PRN
Qty: 20 TABLET | Refills: 0 | Status: SHIPPED | OUTPATIENT
Start: 2025-06-16

## 2025-06-18 ENCOUNTER — APPOINTMENT (OUTPATIENT)
Dept: NEUROLOGY | Facility: CLINIC | Age: 35
End: 2025-06-18
Payer: COMMERCIAL

## 2025-06-18 VITALS
SYSTOLIC BLOOD PRESSURE: 132 MMHG | HEART RATE: 71 BPM | WEIGHT: 220.2 LBS | TEMPERATURE: 97.3 F | HEIGHT: 62 IN | DIASTOLIC BLOOD PRESSURE: 80 MMHG | BODY MASS INDEX: 40.52 KG/M2

## 2025-06-18 DIAGNOSIS — H47.10 PAPILLEDEMA: Primary | ICD-10-CM

## 2025-06-18 DIAGNOSIS — R51.9 ACUTE INTRACTABLE HEADACHE, UNSPECIFIED HEADACHE TYPE: ICD-10-CM

## 2025-06-18 PROCEDURE — 1036F TOBACCO NON-USER: CPT | Performed by: PSYCHIATRY & NEUROLOGY

## 2025-06-18 PROCEDURE — 99205 OFFICE O/P NEW HI 60 MIN: CPT | Performed by: PSYCHIATRY & NEUROLOGY

## 2025-06-18 PROCEDURE — 3008F BODY MASS INDEX DOCD: CPT | Performed by: PSYCHIATRY & NEUROLOGY

## 2025-06-18 RX ORDER — DIAZEPAM 10 MG/1
TABLET ORAL
Qty: 1 TABLET | Refills: 0 | Status: SHIPPED | OUTPATIENT
Start: 2025-06-18

## 2025-06-18 ASSESSMENT — LIFESTYLE VARIABLES
HOW MANY STANDARD DRINKS CONTAINING ALCOHOL DO YOU HAVE ON A TYPICAL DAY: PATIENT DOES NOT DRINK
HOW OFTEN DO YOU HAVE SIX OR MORE DRINKS ON ONE OCCASION: NEVER
SKIP TO QUESTIONS 9-10: 1
HOW OFTEN DO YOU HAVE A DRINK CONTAINING ALCOHOL: NEVER
AUDIT-C TOTAL SCORE: 0

## 2025-06-18 ASSESSMENT — PATIENT HEALTH QUESTIONNAIRE - PHQ9
2. FEELING DOWN, DEPRESSED OR HOPELESS: NOT AT ALL
SUM OF ALL RESPONSES TO PHQ9 QUESTIONS 1 & 2: 0
1. LITTLE INTEREST OR PLEASURE IN DOING THINGS: NOT AT ALL

## 2025-06-18 NOTE — LETTER
June 18, 2025     Patient: Christianne Jurado   YOB: 1990   Date of Visit: 6/18/2025       To Whom It May Concern:    Christianne Jurado was seen in my clinic on 6/18/2025 at 8:00 am. Please excuse Christianne for her absence from work on this day to make the appointment.    If you have any questions or concerns, please don't hesitate to call.         Sincerely,         Rogelio Zamorano MD        CC: No Recipients

## 2025-06-18 NOTE — PROGRESS NOTES
Subjective   Christianne Jurado is a 35 y.o. female who presents for evaluation of Migraines. Symptoms began about 2 weeks ago. Generally, the migraines last about 24 hours and occur every day. The migraines do not seem to be related to any time of day or year. The migraine is usually dull, pounding, sharp, squeezing, and throbbing and are located in the temples.  The patient rates her most severe migraine a 8 on a scale from 1 to 10. Work attendance or other daily activities are affected by the migraines. Precipitating factors include: light and odors. The headaches are usually preceded by an aura consisting of squiggly lines in visit. Associated neurologic symptoms: worsening school/work performance and nausea, vomiting, apahasia, balance, dizziness, temporary vision loss. Home treatment has included acetaminophen and ibuprofen, darkening the room, resting, and sleeping she is now on Sumatriptan with not sure if it helps since she has not tried it. Other history includes: nothing pertinent. Family history includes migraine headaches in grandmother.

## 2025-06-18 NOTE — PROGRESS NOTES
"Consulting Physician: None    Chief Complaint: Headaches    History Of Present Illness  Christianne Jurado is a 35 y.o. female presenting with headaches.    On 6/7, the patient had a migraine.  The following day, she developed right sided numbness.  She noted difficulty looking at her key board.  She developed blurry vision out of her right eye.  She noted confusion. She then developed a migraine.  She went to the ED.  Head CT was negative.  She was told that she had a migraine.  She was given a migraine cocktail.  Her symptoms improved.  However, over the last week, she has had 5 migraines.  Her migraines are described as pressure/throbbing pain in the bifrontal region.  She has associated photophobia, phonophobia, nausea, and vomiting.  She does have a preceding visual aura (she see's squiggly lines just before a migraine).  She usually takes Ibuprofen which breaks the migraine.  Prior to 6/7, her migraine frequency was once per year.  She does note occasional pulsatile tinnitus.  She denies any double vision or loss of peripheral vision.  Of note, she has a history of viral meningitis in 2016.  She had seen Dr. Valdivia in 2024 and was being worked up for II.       Past Medical History  Medical History[1]    Surgical History  Surgical History[2]    Family History  Family History[3]     Social History   reports that she has never smoked. She has never used smokeless tobacco. She reports that she does not drink alcohol and does not use drugs.     Allergies  Sertraline, Trazodone, Bupropion, Buspirone, Paroxetine, and Paroxetine hcl    Medications  Current Medications[4]      Last Recorded Vitals   Last menstrual period 05/13/2025.    Objective:  /80 (BP Location: Right arm, Patient Position: Sitting, BP Cuff Size: Adult)   Pulse 71   Temp 36.3 °C (97.3 °F) (Temporal)   Ht 1.575 m (5' 2\")   Wt 99.9 kg (220 lb 3.2 oz)   LMP 05/13/2025   BMI 40.28 kg/m²     Gen: NAD  Neuro:  --HIF: A&O X 3, repetition and " "naming intact  --CN:  PERRLA, EOMI, VFF, no visible facial asymmetry, facial sensation intact, no tongue or palatal deviation, SCM intact  --Motor: Moves all 4 extremities equally; no focal deficits  --Sensory: Intact to light touch, intact to pinprick  --Reflex: 2+ symmetric, toes down  --Cerebellum: FTN and HTS intact  --Gait: Normal, narrow based.  Toe and Heal Walking Intact.  Tandem Intact    Relevant Results  Lab Results   Component Value Date    WBC 8.4 06/08/2025    HGB 14.7 06/08/2025    HCT 44.2 06/08/2025    MCV 86 06/08/2025     06/08/2025       Lab Results   Component Value Date    GLUCOSE 92 06/08/2025    CALCIUM 9.3 06/08/2025     06/08/2025    K 4.0 06/08/2025    CO2 26 06/08/2025     06/08/2025    BUN 16 06/08/2025    CREATININE 0.70 06/08/2025       Lab Results   Component Value Date    HGBA1C 5.1 11/17/2022       Lab Results   Component Value Date    CHOL 258 (H) 03/01/2024    CHOL 265 (H) 04/08/2021    CHOL 185 12/31/2019     Lab Results   Component Value Date    HDL 44.2 03/01/2024    HDL 45.0 04/08/2021    HDL 40.6 12/31/2019     Lab Results   Component Value Date    LDLCALC 189 (H) 03/01/2024     Lab Results   Component Value Date    TRIG 124 03/01/2024    TRIG 173 (H) 04/08/2021    TRIG 120 12/31/2019     No components found for: \"CHOLHDL\"    CT Head (I personally reviewed the images/tracings with the following interpretation)  No acute changes    MRI Brain 2024 (I personally reviewed the images/tracings with the following interpretation)  normal     Assessment:  This is a 35 year old female presenting for evaluation of migraine.  She states that she has a history of migraines.  ON 6/7, she had an episode of right sided numbness, confusion, and blurry vision.  The patient has noted almost daily headaches since 6/7 - described as a bifrontal throbbing/pressure pain with associated photophobia, phonophobia, nausea, and vomiting.  Of note, she did see Dr. Valdivia and was being " worked up for IIH.  She also has a history of viral meningitis in 2024.    On my exam there is bilateral papilledema.    Ddx includes PTC.  Other considerations include migraine.  Given focal neurological deficits on 6/7, there could also be a stroke.    Recommend MRI brain with and without contrast  LP to measure opening pressure - will check CSF WBC, RBC, Glucose, Protein  Consider starting Acetazolamide    Follow up after above,    Rogelio Zamorano MD  [place]  Department of Neurology      A copy of this note was sent to the referring provider.         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    Right foot pain 03/01/2024    Suspected COVID-19 virus infection 03/01/2024    Tachycardia 03/01/2024    Tinea versicolor 03/25/2016   [2]   Past Surgical History:  Procedure Laterality Date    APPENDECTOMY      MR HEAD ANGIO WO IV CONTRAST  06/29/2017    MR HEAD ANGIO WO IV CONTRAST 6/29/2017 [facility] CL[facility]GAC[facility]   [3]   Family History  Problem Relation Name Age of Onset    Diabetes Mother Rachel     Hypertension Mother Rachel     Hyperlipidemia Mother Rachel     Mental illness Mother Rachel     Hypertension Father Vinnie     Hyperlipidemia Father Vinnie    [4]   Current Outpatient Medications:     ALPRAZolam (Xanax) 0.25 mg tablet, Take 1 tablet (0.25 mg) by mouth as needed at bedtime for anxiety., Disp: , Rfl:     ondansetron ODT (Zofran-ODT) 8 mg disintegrating tablet, Dissolve 1 tablet (8 mg) in the mouth 3 times a day as needed for nausea or vomiting., Disp: 20 tablet, Rfl: 0    SUMAtriptan (Imitrex) 100 mg tablet, Take 1 tablet (100 mg) by mouth 1 time if needed for  migraine., Disp: 9 tablet, Rfl: 5    valACYclovir (Valtrex) 1 gram tablet, TAKE 2 TABLETS BY MOUTH AT ONSET AND TAKE 2 TABLETS 12 HOURS LATER., Disp: , Rfl:

## 2025-06-29 LAB
ATRIAL RATE: 71 BPM
P AXIS: 41 DEGREES
P OFFSET: 200 MS
P ONSET: 143 MS
PR INTERVAL: 148 MS
Q ONSET: 217 MS
QRS COUNT: 12 BEATS
QRS DURATION: 96 MS
QT INTERVAL: 400 MS
QTC CALCULATION(BAZETT): 434 MS
QTC FREDERICIA: 423 MS
R AXIS: 68 DEGREES
T AXIS: 55 DEGREES
T OFFSET: 417 MS
VENTRICULAR RATE: 71 BPM

## 2025-07-11 ENCOUNTER — APPOINTMENT (OUTPATIENT)
Dept: RADIOLOGY | Facility: HOSPITAL | Age: 35
End: 2025-07-11
Payer: COMMERCIAL

## 2025-08-01 ENCOUNTER — APPOINTMENT (OUTPATIENT)
Dept: RADIOLOGY | Facility: HOSPITAL | Age: 35
End: 2025-08-01
Payer: COMMERCIAL

## 2025-09-12 ENCOUNTER — APPOINTMENT (OUTPATIENT)
Dept: PRIMARY CARE | Facility: CLINIC | Age: 35
End: 2025-09-12
Payer: COMMERCIAL

## 2025-09-25 ENCOUNTER — APPOINTMENT (OUTPATIENT)
Dept: NEUROLOGY | Facility: CLINIC | Age: 35
End: 2025-09-25
Payer: COMMERCIAL

## 2025-10-02 ENCOUNTER — APPOINTMENT (OUTPATIENT)
Dept: NEUROLOGY | Facility: CLINIC | Age: 35
End: 2025-10-02
Payer: COMMERCIAL